# Patient Record
Sex: MALE | Race: WHITE
[De-identification: names, ages, dates, MRNs, and addresses within clinical notes are randomized per-mention and may not be internally consistent; named-entity substitution may affect disease eponyms.]

---

## 2017-01-11 ENCOUNTER — HOSPITAL ENCOUNTER (INPATIENT)
Dept: HOSPITAL 74 - FER | Age: 52
LOS: 9 days | Discharge: HOME | DRG: 329 | End: 2017-01-20
Attending: FAMILY MEDICINE | Admitting: FAMILY MEDICINE
Payer: COMMERCIAL

## 2017-01-11 VITALS — BODY MASS INDEX: 22.8 KG/M2

## 2017-01-11 DIAGNOSIS — K57.10: ICD-10-CM

## 2017-01-11 DIAGNOSIS — E87.0: ICD-10-CM

## 2017-01-11 DIAGNOSIS — E87.6: ICD-10-CM

## 2017-01-11 DIAGNOSIS — Z53.31: ICD-10-CM

## 2017-01-11 DIAGNOSIS — E83.42: ICD-10-CM

## 2017-01-11 DIAGNOSIS — K56.5: Primary | ICD-10-CM

## 2017-01-11 DIAGNOSIS — R33.9: ICD-10-CM

## 2017-01-11 DIAGNOSIS — M45.9: ICD-10-CM

## 2017-01-11 DIAGNOSIS — K66.0: ICD-10-CM

## 2017-01-11 DIAGNOSIS — K55.029: ICD-10-CM

## 2017-01-11 DIAGNOSIS — D50.0: ICD-10-CM

## 2017-01-11 DIAGNOSIS — R50.81: ICD-10-CM

## 2017-01-11 DIAGNOSIS — Z85.47: ICD-10-CM

## 2017-01-11 LAB
ALBUMIN SERPL-MCNC: 4.1 G/DL (ref 3.5–5)
ALP SERPL-CCNC: 58 U/L (ref 32–92)
ALT SERPL-CCNC: 14 U/L (ref 10–40)
AMYLASE SERPL-CCNC: 42 U/L (ref 25–125)
AMYLASE SERPL-CCNC: 47 U/L (ref 25–125)
ANION GAP SERPL CALC-SCNC: 9 MMOL/L (ref 8–16)
APTT BLD: 34.4 SECONDS (ref 24–38.9)
AST SERPL-CCNC: 33 U/L (ref 10–42)
BACTERIA #/AREA URNS HPF: (no result) /HPF
BASOPHILS # BLD: 0.8 % (ref 0–2)
BILIRUB SERPL-MCNC: 2 MG/DL (ref 0.2–1)
BILIRUB UR STRIP.AUTO-MCNC: (no result) MG/DL
CALCIUM SERPL-MCNC: 9.1 MG/DL (ref 8.4–10.2)
CK SERPL-CCNC: 55 IU/L (ref 38–174)
CO2 SERPL-SCNC: 26 MMOL/L (ref 22–28)
COLOR UR: YELLOW
CREAT SERPL-MCNC: 1 MG/DL (ref 0.6–1.3)
DEPRECATED RDW RBC AUTO: 12.6 % (ref 11.9–15.9)
EOSINOPHIL # BLD: 0 % (ref 0–4.5)
GLUCOSE SERPL-MCNC: 135 MG/DL (ref 74–106)
INR BLD: 1.79 (ref 0.82–1.09)
MCH RBC QN AUTO: 31.2 PG (ref 25.7–33.7)
MCHC RBC AUTO-ENTMCNC: 33 G/DL (ref 32–35.9)
MCV RBC: 94.4 FL (ref 80–96)
NEUTROPHILS # BLD: 87.6 % (ref 42.8–82.8)
PH UR: 6 [PH] (ref 4.5–8)
PLATELET # BLD AUTO: 285 K/MM3 (ref 134–434)
PMV BLD: 7.5 FL (ref 7.5–11.1)
PROT SERPL-MCNC: 6.8 G/DL (ref 6.4–8.3)
PROT UR QL STRIP: (no result)
PT PNL PPP: 19.5 SEC (ref 10.2–13)
RBC # BLD AUTO: (no result) /HPF (ref 0–3)
RBC # UR STRIP: (no result) /UL
SP GR UR: 1.01 (ref 1–1.02)
TROPONIN I SERPL-MCNC: < 0.03 NG/ML (ref 0.03–0.5)
TROPONIN I SERPL-MCNC: < 0.03 NG/ML (ref 0–0.05)
UROBILINOGEN UR STRIP-MCNC: (no result) MG/DL (ref 0.2–1)
WBC # BLD AUTO: 18.3 K/MM3 (ref 4–10)
WBC # UR AUTO: (no result) /UL (ref 3–5)

## 2017-01-11 RX ADMIN — PANTOPRAZOLE SODIUM SCH MLS/HR: 40 INJECTION, POWDER, FOR SOLUTION INTRAVENOUS at 21:34

## 2017-01-11 RX ADMIN — DEXTROSE AND SODIUM CHLORIDE SCH MLS/HR: 5; 450 INJECTION, SOLUTION INTRAVENOUS at 21:35

## 2017-01-11 RX ADMIN — HEPARIN SODIUM SCH UNIT: 5000 INJECTION, SOLUTION INTRAVENOUS; SUBCUTANEOUS at 21:35

## 2017-01-11 RX ADMIN — HYDROMORPHONE HYDROCHLORIDE PRN MG: 2 INJECTION, SOLUTION INTRAMUSCULAR; INTRAVENOUS; SUBCUTANEOUS at 23:00

## 2017-01-11 NOTE — HP
Admitting History and Physical





- Admission


Chief Complaint: acute abdomen pain x 2 days


History of Present Illness: 


WIFE AT Bedside ; 51 M w hx  Testis CA (2010) On remission post Orchiectomy and 

Lymph node resction Plus ChemoTx ( [place] Eval 2016) an episode of LLQ abdominal 

discomfort in past was told was assoc w adhesions. Has Usually Daily BM. He was 

in [facility] untill 2 days pre admission w periumb abdomen pain w Vomitin x 2 on 1 

day pre adm. Pt reported minimal BM on that day. Pt came to ED for Eval. 


History Source: Patient, Family Member


Limitations to Obtaining History: No Limitations





- Past Medical History


Gastrointestinal: Yes: Other (Adhesions post Testis Ca Sx 2010)


Musculoskeletal: Yes: Other (Ankylosing Spondylitis)





- Past Surgical History


Additional Past Surgical History: 


Rt Orchiectomy (2010) ;  Abdominal Lymph Node Resection (2010)





- Advance Directives


Advance Directives: Yes: Living Will, Health Care Proxy





- Smoking History


Smoking history: Never smoked


Have you smoked in the past 12 months: No





- Alcohol/Substance Use


Hx Alcohol Use: Yes (social)





- Social History


ADL: Independent


History of Recent Travel: No





Home Medications





- Allergies


Allergies/Adverse Reactions: 


 Allergies











Allergy/AdvReac Type Severity Reaction Status Date / Time


 


No Known Allergies Allergy   Verified 01/11/17 08:57














- Home Medications


Home Medications: 


Ambulatory Orders





Meloxicam [Mobic] 15 mg PO DAILY 01/11/17 


Sulfasalazine 1,000 mg PO DAILY 01/11/17 











Family Disease History





- Family Disease History


Family History: Unremarkable





Review of Systems





- Review of Systems


Constitutional: reports: No Symptoms, Loss of Appetite


HENT: reports: No Symptoms


Neck: reports: No Symptoms


Cardiovascular: reports: No Symptoms


Respiratory: reports: No Symptoms


Gastrointestinal: reports: Abdominal Pain, Constipation, Nausea, Vomiting


Genitourinary: reports: No Symptoms


Breasts: reports: No Symptoms Reported


Musculoskeletal: reports: Back Pain


Neurological: reports: No Symptoms


Endocrine: reports: No Symptoms


Hematology/Lymphatic: reports: No Symptoms


Psychiatric: reports: No Symptoms


Pain Intensity: 4





Physical Examination


Vital Signs: 


 Vital Signs











Temperature  99 F   01/11/17 16:36


 


Pulse Rate  86   01/11/17 16:36


 


Respiratory Rate  16   01/11/17 16:36


 


Blood Pressure  146/78   01/11/17 16:36


 


O2 Sat by Pulse Oximetry (%)  96   01/11/17 14:00











Constitutional: Yes: Well Nourished, Moderate Distress


Neck: Yes: Supple, Trachea Midline


Cardiovascular: Yes: Regular Rate and Rhythm


Respiratory: Yes: Regular, CTA Bilaterally


Gastrointestinal: Yes: Hypoactive Bowel Sounds, Tenderness, Rebound, Other (

pain w dig press MILD)


...Rectal Exam: Yes: Deferred


Musculoskeletal: Yes: Back Pain, Other (due to ch dz usually worse when in bed 

for long periods)


Extremities: Yes: WNL


Edema: No


Peripheral Pulses WNL: Yes


Peripheral Pulses: Left Doralis Pedis: 2+, Right Dorsalis Pedis: 2+


Neurological: Yes: WNL, Alert, Oriented


...Motor Strength: WNL


Psychiatric: Yes: Alert, Oriented





Imaging





- Results


Chest X-ray: Report Reviewed


Cat Scan: Report Reviewed, Other (ACUTE SBO with transition zone in mid abd and 

retroperotoneal  multiple clips and adesions)





Problem List





- Problems


(1) Testicle cancer


Assessment/Plan: 


In Remission- per Urol 2016 appt


Code(s): C62.90 - MALIG NEOPLASM OF UNSP TESTIS, UNSP DESCENDED OR UNDESCENDED 

  





(2) Synechia, posterior


Assessment/Plan: 


see CT Casn abd/Pelv report -> SBO


Code(s): H21.549 - POSTERIOR SYNECHIAE (IRIS), UNSPECIFIED EYE   





(3) Ankylosing spondylitis


Assessment/Plan: 


chronic on meds pre admission


Code(s): M45.9 - ANKYLOSING SPONDYLITIS OF UNSPECIFIED SITES IN SPINE





(4) Small bowel obstruction due to adhesions


Assessment/Plan: 


ADMISSION Dx- Acute - Surgeon Aware; Placed on NPO, NGT, PPI/IV, IVF Maintenance

, IV Pain Meds, HOLD Preadmission po meds. Disc w Pt and Wife All of above and 

plan of care. Defer to Surgeon further mgmt.


Code(s): K56.5 - INTESTINAL ADHESIONS W OBST (POSTPROCEDURAL) (POSTINFECTION)

## 2017-01-11 NOTE — PDOC
History of Present Illness





- General


Stated Complaint: ABDOMINAL PAIN


Time Seen by Provider: 01/11/17 08:35


History Source: Patient


Exam Limitations: No Limitations





- History of Present Illness


Initial Comments: 





01/11/17 09:42


This is a 52-year-old male with a history of testicular cancer status post 

surgical intervention and chemotherapy


Pt states that tow days ago, he began to have abdominal cramping/pain


Pain is located in the center of the abdomen


Pain is constant and intermittently, pt notes pain diffusely through out the 

abdomen


Pt noted fevers or and chills last night


(+) nausea and vomiting, multiple episodes (bilious)


No diarrhea


Pt concerned because he has not passed flatus in the past day


No recent travel


No ill contacts


No change in diet


Last night, temp 102





PMH: Testicular cancer, Ankylosing Spondolytis


PSH: Retroperitoneal LND


Meds: mobic, Sulfasalazine


ALL: NKDA


Social: Alcohol socially, drugs denies





GENERAL/CONSTITUTIONAL: Yes: fever, chills, weakness, loss of appetite.


HEAD, EYES, EARS, NOSE AND THROAT: No: change in vision, ear pain, discharge, 

sore throat, throat swelling.


CARDIOVASCULAR: No: chest pain, lightheadedness, palpitations, syncope


RESPIRATORY: No: cough, shortness of breath, wheezing, hemoptysis, stridor.


GASTROINTESTINAL: Yes: nausea, vomiting, abdominal pain No: diarrhea


GENITOURINARY: No: dysuria, hematuria, frequency, urgency, flank pain.


MUSCULOSKELETAL: No: back pain, neck pain, joint pain, muscle swelling or pain


SKIN AND BREASTS: No: lesions, pallor, rash or easy bruising.


NEUROLOGIC: No: headache, vertigo, paresthesias, weakness 


ENDOCRINE: No: unexplained weight gain or loss


HEMATOLOGIC/LYMPHATIC: No: anemia, easy bleeding, swelling nodes.








GENERAL: The patient is in no acute distress.


HEAD: Normal with no signs of trauma.


EYES: PERRLA, EOMI, sclera anicteric, conjunctiva clear.


ENT: Ears normal, nares patent, oropharynx clear without exudates.  Moist 

mucous membranes.


NECK: Normal range of motion, supple without lymphadenopathy, JVD, or masses.


LUNGS: Breath sounds equal, clear to auscultation bilaterally.  No wheezes, and 

no crackles.


HEART:Regular rate and rhythm, normal S1 and S2 without murmur, rub or gallop.


ABDOMEN: Soft, very tender to palpation in the supraumbilical area, (+) 

voluntary guarding, no rebound Also LLQ tenderness to palpation 


EXTREMITIES: Normal range of motion, no edema.  No clubbing or cyanosis. No 

erythema, or tenderness.


NEUROLOGICAL: Cranial nerves II through XII grossly intact.  Normal speech.  No 

focal neurological deficits. 


MUSCULOSKELETAL:  Back non-tender to palpation, no CVA tenderness


SKIN: Well healed midline scar of the abdomen 





Past History





- Past Medical History


Allergies/Adverse Reactions: 


 Allergies











Allergy/AdvReac Type Severity Reaction Status Date / Time


 


No Known Allergies Allergy   Verified 01/11/17 08:57











Home Medications: 


Ambulatory Orders





Meloxicam [Mobic] 15 mg PO DAILY 01/11/17 


Sulfasalazine 1,000 mg PO DAILY 01/11/17 











**Heart Score/ECG Review


  ** #1


ECG reviewed & interpreted by me at: 10:27





01/11/17 15:23


Sinus rhythm


Rate of 90 bpm


Left axis deviation


RBBB








ED Treatment Course





- LABORATORY


CBC & Chemistry Diagram: 


 01/12/17 07:53





 01/12/17 07:53





- ADDITIONAL ORDERS


Additional order review: 


 











  01/11/17





  09:11


 


RBC  4.64


 


MCV  94.4


 


MCHC  33.0


 


RDW  12.6


 


MPV  7.5


 


Neutrophils %  87.6 H


 


Lymphocytes %  4.2 L


 


Monocytes %  7.4


 


Eosinophils %  0.0


 


Basophils %  0.8














- RADIOLOGY


Radiology Studies Ordered: 














 Category Date Time Status


 


 ABDOMEN & PELVIS CT WITH CONTR [CT] Stat CT Scan  01/11/17 09:39 Ordered














Medical Decision Making





- Medical Decision Making


01/11/17 10:27


Will do labs


Will do CT of the abd and pelvis


Will do IVF


Will re assess





 Laboratory Tests











  01/11/17 01/11/17





  09:11 09:11


 


WBC  18.3 H 


 


Hgb  14.5 


 


Hct  43.8 


 


Plt Count  285 


 


Neutrophils %  87.6 H 


 


BUN   18


 


Creatinine   1.0














01/11/17 11:36


CT: SBO





Case reviewed with Dr Key


NGT to be placed


Will monitor output


Will admit to Dr. Jose Martin Hdez








01/11/17 11:45


Pending discussion with Dr Hdez





01/11/17 12:19


Case reviewed with Dr Jose Martin Hdez


Will admit to med surg








01/11/17 12:58


Nurses attempt to place NGT - unable to pass Left nare


Attempted twice to place NGT in right nare --> coughing


Removed


Pt vomited twice








01/11/17 13:18


NGT placed





01/11/17 15:23


EKG : Left axis deviation, RBBB








*DC/Admit/Observation/Transfer


Diagnosis at time of Disposition: 


 Small bowel obstruction due to adhesions





- Discharge Dispostion


Condition at time of disposition: Stable


Admit: Yes

## 2017-01-12 LAB
ALBUMIN SERPL-MCNC: 3.4 G/DL (ref 3.5–5)
ALP SERPL-CCNC: 58 U/L (ref 32–92)
ALT SERPL-CCNC: 12 U/L (ref 10–40)
ANION GAP SERPL CALC-SCNC: 10 MMOL/L (ref 8–16)
AST SERPL-CCNC: 28 U/L (ref 10–42)
BASOPHILS # BLD: 0.1 % (ref 0–2)
BILIRUB SERPL-MCNC: 1.4 MG/DL (ref 0.2–1)
CALCIUM SERPL-MCNC: 8.5 MG/DL (ref 8.4–10.2)
CO2 SERPL-SCNC: 24 MMOL/L (ref 22–28)
CREAT SERPL-MCNC: 0.8 MG/DL (ref 0.6–1.3)
DEPRECATED RDW RBC AUTO: 12.9 % (ref 11.9–15.9)
EOSINOPHIL # BLD: 0 % (ref 0–4.5)
GLUCOSE SERPL-MCNC: 139 MG/DL (ref 74–106)
MCH RBC QN AUTO: 31.3 PG (ref 25.7–33.7)
MCHC RBC AUTO-ENTMCNC: 32.5 G/DL (ref 32–35.9)
MCV RBC: 96.4 FL (ref 80–96)
NEUTROPHILS # BLD: 88.2 % (ref 42.8–82.8)
PLATELET # BLD AUTO: 231 K/MM3 (ref 134–434)
PMV BLD: 7.7 FL (ref 7.5–11.1)
PROT SERPL-MCNC: 5.5 G/DL (ref 6.4–8.3)
WBC # BLD AUTO: 17 K/MM3 (ref 4–10)

## 2017-01-12 RX ADMIN — HYDROMORPHONE HYDROCHLORIDE PRN MG: 2 INJECTION, SOLUTION INTRAMUSCULAR; INTRAVENOUS; SUBCUTANEOUS at 11:24

## 2017-01-12 RX ADMIN — PANTOPRAZOLE SODIUM SCH: 40 INJECTION, POWDER, FOR SOLUTION INTRAVENOUS at 22:00

## 2017-01-12 RX ADMIN — PIPERACILLIN SODIUM,TAZOBACTAM SODIUM SCH MLS/HR: 3; .375 INJECTION, POWDER, FOR SOLUTION INTRAVENOUS at 17:41

## 2017-01-12 RX ADMIN — HYDROMORPHONE HYDROCHLORIDE PRN MG: 2 INJECTION, SOLUTION INTRAMUSCULAR; INTRAVENOUS; SUBCUTANEOUS at 17:41

## 2017-01-12 RX ADMIN — HEPARIN SODIUM SCH UNIT: 5000 INJECTION, SOLUTION INTRAVENOUS; SUBCUTANEOUS at 06:33

## 2017-01-12 RX ADMIN — DEXTROSE AND SODIUM CHLORIDE SCH: 5; 450 INJECTION, SOLUTION INTRAVENOUS at 19:00

## 2017-01-12 RX ADMIN — HEPARIN SODIUM SCH: 5000 INJECTION, SOLUTION INTRAVENOUS; SUBCUTANEOUS at 22:00

## 2017-01-12 RX ADMIN — HEPARIN SODIUM SCH UNIT: 5000 INJECTION, SOLUTION INTRAVENOUS; SUBCUTANEOUS at 14:32

## 2017-01-12 RX ADMIN — ACETAMINOPHEN PRN MLS/HR: 10 INJECTION, SOLUTION INTRAVENOUS at 13:46

## 2017-01-12 RX ADMIN — PANTOPRAZOLE SODIUM SCH MLS/HR: 40 INJECTION, POWDER, FOR SOLUTION INTRAVENOUS at 10:19

## 2017-01-12 NOTE — PN
Progress Note, Physician


Chief Complaint: 


abdomen pain very much improved to minimal-mild; POS BM and Gas w Fleets and BM 

mgmt per Sx.


History of Present Illness: 


SEE Above; see Sx Notes.





- Current Medication List


Current Medications: 


Active Medications





Diphenhydramine HCl (Benadryl -)  25 mg PO HS PRN


   PRN Reason: INSOMNIA


Heparin Sodium (Porcine) (Heparin -)  5,000 unit SQ TID DEIDRA


   Last Admin: 01/12/17 14:32 Dose:  5,000 unit


Hydromorphone HCl (Dilaudid Injection -)  2 mg IVPUSH Q4H PRN


   PRN Reason: PAIN


   Last Admin: 01/12/17 17:41 Dose:  2 mg


Dextrose/Sodium Chloride (D5-1/2ns -)  1,000 mls @ 83 mls/hr IV ASDIR DEIDRA


   Last Admin: 01/11/17 21:35 Dose:  83 mls/hr


Pantoprazole Sodium (Protonix 40mg Ivpb (Pre-Docked))  100 mls @ 200 mls/hr 

IVPB BID DEIDRA


   Last Admin: 01/12/17 10:19 Dose:  200 mls/hr


Acetaminophen (Ofirmev Injection -)  65 mls @ 260 mls/hr IVPB Q4H PRN


   PRN Reason: PAIN/FEVER


   Last Admin: 01/12/17 13:46 Dose:  260 mls/hr


Piperacillin Sod/Tazobactam Sod (Zosyn 3.375gm Ivpb (Pre-Docked))  50 mls @ 100 

mls/hr IVPB Q8H-IV DEIDRA


   Last Admin: 01/12/17 17:41 Dose:  100 mls/hr











- Objective


Vital Signs: 


 Vital Signs











Temperature  101.1 F H  01/12/17 22:34


 


Pulse Rate  91 H  01/12/17 22:34


 


Respiratory Rate  17   01/12/17 22:34


 


Blood Pressure  134/70   01/12/17 22:34


 


O2 Sat by Pulse Oximetry (%)  95   01/12/17 22:34








TEMP 101.1


Constitutional: Yes: No Distress, Calm


Neck: Yes: WNL, Supple


Cardiovascular: Yes: Regular Rate and Rhythm


Respiratory: Yes: Regular, CTA Bilaterally


Gastrointestinal: Yes: Soft, Hyperactive Bowel Sounds, Other (+NGT in place; 

Mild Tend w deep press periumb area much improved c/w yest exam; no rbed no 

gding no bloating, no distention POS Soft and Depressible)


...Rectal Exam: Yes: Deferred


Musculoskeletal: Yes: Back Pain (+ ch pre admiss)


Extremities: Yes: WNL, Calf Tenderness (NONE)


Edema: No


Peripheral Pulses: Left Doralis Pedis: 2+, Right Dorsalis Pedis: 2+


Integumentary: Yes: WNL


Neurological: Yes: WNL, Alert, Oriented


...Motor Strength: WNL


Psychiatric: Yes: WNL, Alert, Oriented


Labs: 


 CBC, BMP





 01/12/17 07:53 





 01/12/17 07:53 





 INR, PTT











INR  1.79  (0.82-1.09)  H  01/11/17  20:15    








 Current Medications











Generic Name Dose Route Start Last Admin





  Trade Name Freq  PRN Reason Stop Dose Admin


 


Diphenhydramine HCl  25 mg 01/11/17 18:55  





  Benadryl -  PO   





  HS PRN   





  INSOMNIA   


 


Heparin Sodium (Porcine)  5,000 unit 01/11/17 22:00 01/12/17 14:32





  Heparin -  SQ   5,000 unit





  TID DEIDRA   Administration


 


Hydromorphone HCl  2 mg 01/11/17 18:55 01/12/17 17:41





  Dilaudid Injection -  IVPUSH   2 mg





  Q4H PRN   Administration





  PAIN   


 


Dextrose/Sodium Chloride  1,000 mls @ 83 mls/hr 01/11/17 19:00 01/11/17 21:35





  D5-1/2ns -  IV   83 mls/hr





  ASDIR DEIDRA   Administration


 


Pantoprazole Sodium  100 mls @ 200 mls/hr 01/11/17 22:00 01/12/17 10:19





  Protonix 40mg Ivpb (Pre-Docked)  IVPB   200 mls/hr





  BID DEIDRA   Administration


 


Acetaminophen  65 mls @ 260 mls/hr 01/12/17 13:22 01/12/17 13:46





  Ofirmev Injection -  IVPB   260 mls/hr





  Q4H PRN   Administration





  PAIN/FEVER   


 


Piperacillin Sod/Tazobactam Sod  50 mls @ 100 mls/hr 01/12/17 18:00 01/12/17 17:

41





  Zosyn 3.375gm Ivpb (Pre-Docked)  IVPB   100 mls/hr





  Q8H-IV DEIDRA   Administration








 Laboratory Last Values











WBC  17.0 K/mm3 (4.0-10.0)  H  01/12/17  07:53    


 


RBC  4.21 M/mm3 (4.00-5.60)   01/12/17  07:53    


 


Hgb  13.2 GM/dl (11.7-16.9)   01/12/17  07:53    


 


Hct  40.6 % (35.4-49)   01/12/17  07:53    


 


MCV  96.4 fl (80-96)  H  01/12/17  07:53    


 


MCHC  32.5 g/dl (32.0-35.9)   01/12/17  07:53    


 


RDW  12.9 % (11.9-15.9)   01/12/17  07:53    


 


Plt Count  231 K/MM3 (134-434)   01/12/17  07:53    


 


MPV  7.7 fl (7.5-11.1)   01/12/17  07:53    


 


Neutrophils %  88.2 % (42.8-82.8)  H  01/12/17  07:53    


 


Lymphocytes %  4.0 % (8-40)  L  01/12/17  07:53    


 


Monocytes %  7.7 % (3.8-10.2)   01/12/17  07:53    


 


Eosinophils %  0.0 % (0-4.5)   01/12/17  07:53    


 


Basophils %  0.1 % (0-2.0)   01/12/17  07:53    


 


INR  1.79  (0.82-1.09)  H  01/11/17  20:15    


 


PTT (Actin FS)  34.4 SECONDS (24.0-38.9)   01/11/17  20:15    


 


Sodium  135 mmol/L (136-145)  L  01/12/17  07:53    


 


Potassium  3.7 mmol/L (3.5-5.1)   01/12/17  07:53    


 


Chloride  101 mmol/L ()   01/12/17  07:53    


 


Carbon Dioxide  24 mmol/L (22-28)   01/12/17  07:53    


 


Anion Gap  10  (8-16)   01/12/17  07:53    


 


BUN  14 mg/dl (7-18)  D 01/12/17  07:53    


 


Creatinine  0.8 mg/dl (0.6-1.3)   01/12/17  07:53    


 


Creat Clearance w eGFR  > 60  (>60)   01/12/17  07:53    


 


Random Glucose  139 mg/dl ()  H  01/12/17  07:53    


 


Lactic Acid  1.017 mmol/L (0.4-2.0)   01/11/17  11:33    


 


Calcium  8.5 mg/dl (8.4-10.2)   01/12/17  07:53    


 


Total Bilirubin  1.4 mg/dl (0.2-1.0)  H D 01/12/17  07:53    


 


AST  28 U/L (10-42)   01/12/17  07:53    


 


ALT  12 U/L (10-40)   01/12/17  07:53    


 


Alkaline Phosphatase  58 U/L (32-92)   01/12/17  07:53    


 


Creatine Kinase  55 IU/L ()   01/11/17  15:00    


 


Troponin I  < 0.03 ng/ml (0.00-0.05)   01/11/17  20:15    


 


Total Protein  5.5 g/dl (6.4-8.3)  L  01/12/17  07:53    


 


Albumin  3.4 g/dl (3.5-5.0)  L  01/12/17  07:53    


 


Total Amylase  42 U/L ()   01/11/17  20:15    


 


Lipase  17 U/L (22-51)  L  01/11/17  20:15    


 


Urine Color  Yellow   01/11/17  12:53    


 


Urine Appearance  Clear   01/11/17  12:53    


 


Urine pH  6.0  (4.5-8)   01/11/17  12:53    


 


Ur Specific Gravity  1.010  (1.005-1.025)   01/11/17  12:53    


 


Urine Protein  2+  (NEGATIVE)  H  01/11/17  12:53    


 


Urine Glucose (UA)  Negative  (NEGATIVE)   01/11/17  12:53    


 


Urine Ketones  Trace  (NEGATIVE)   01/11/17  12:53    


 


Urine Blood  3+  (NEGATIVE)  H  01/11/17  12:53    


 


Urine Nitrite  Negative  (NEGATIVE)   01/11/17  12:53    


 


Urine Bilirubin  1+  (NEGATIVE)  H  01/11/17  12:53    


 


Urine Urobilinogen  2.0 e.u/dl  (0.2-1.0)   01/11/17  12:53    


 


Ur Leukocyte Esterase  Negative  (NEGATIVE)   01/11/17  12:53    


 


Urine RBC  5-10 /hpf (0-3)   01/11/17  12:53    


 


Urine WBC  0-2  (3-5)   01/11/17  12:53    


 


Urine Bacteria  Few /hpf (NEGATIVE)   01/11/17  12:53    








Amylase and Lipase NEG;


BCS x2 NEG x 24hrs


[facility] PEND





- ....Imaging


Cat Scan: Report Reviewed (Hi Grade SBO w/o Improvement ( Fup TEST [#2] done 

TODAY IN AM PRIOR To Fleets enema and Bowel Meds and To Pts Large BM and Flatus)





Problem List





- Problems


(1) Testicle cancer


Assessment/Plan: 


In Remission- per Urol 2016 appt; STLL there is some risk that SBO and 

Adhesions are assoc w Mets Testis CA  of Recrred dis - NO HX Of Present Testis 

or any other CA OR Evidence thus far. IF pt undergoes Sx mgmt of SBO, further 

eval of this may be performed introperatvely. Defer To Surgeon performing the 

Surgery if Surgery is done.


Code(s): C62.90 - MALIG NEOPLASM OF UNSP TESTIS, UNSP DESCENDED OR UNDESCENDED 

  





(2) Synechia, posterior


Assessment/Plan: 


With Hi degree SBO in FUP CT abd/pelv done today (CT #2); w/o changes.


Code(s): H21.549 - POSTERIOR SYNECHIAE (IRIS), UNSPECIFIED EYE   





(3) Ankylosing spondylitis


Assessment/Plan: 


chronic on meds pre admission- stable.


Code(s): M45.9 - ANKYLOSING SPONDYLITIS OF UNSPECIFIED SITES IN SPINE





(4) Small bowel obstruction due to adhesions


Assessment/Plan: 


Fup CT Today (CT #2) W Hi Grade FILIPE and No Change - THIS Study was followed by 

a Fllets enema and other BM mgmt. Patient GHad BM w LFlatu which was followed w 

Symptomatic improvement as well as his clinical exam. Patient DOES have temp 

between 100-101+ today. His WBC Is trending down, BCS x 2 are neg x 24hrs, UCS 

pend (W/O sxs) and on IV Zosyn. No Evidence of Perforation OR Sepsis. Will Do 

Daily CT Abd/Pel and cont monitor V Signs and Clinical exam. TPt AND wife 

Declined Sx as rec by inital Sx Team and requesting a second opinion. Defer To 

Surgical team althogh at THIS time to cont present mgmt w close monitoring 

seems reasonable considering clinical improvement. Disc w pt med condition at 

this time and with all ?s asked and responded to pts satisfaction.


Code(s): K56.5 - INTESTINAL ADHESIONS W OBST (POSTPROCEDURAL) (POSTINFECTION)





(5) Fever


Assessment/Plan: 


SEE ABOVE NOTES. No Evidence of Abd Perf or Sepsis Or Infection.


Code(s): R50.9 - FEVER, UNSPECIFIED

## 2017-01-12 NOTE — CONSULT
Consult


Consult Specialty:: surgery


Reason for Consultation:: small bowel obstruction





- History of Present Illness


Chief Complaint: abd pain


History of Present Illness: 


pt developed epigastric pain on monday.  pain was persistent.  BM tuesday and 

had N/V on Tuesday (bilious).  Came to ER on Wednesday (yesterday) because now 

he has fever.  Workup in ED included WBC of 18 and CT showing SBO.  He has hx 

of RPLND 6[place] ago [place] [facility] [place]   Currently he there is no 

evidence recurrence for cancer.  Since being in the hospital he is feeling 

better.  he has NGT and it put out 800cc last shift.  He says he takes the IV 

dilaudid for his ngt pain?  





- History Source


History Provided By: Patient


Limitations to Obtaining History: No Limitations





- Past Medical History


Gastrointestinal: Yes: Other (Adhesions post Testis Ca Sx 2010)


Musculoskeletal: Yes: Other (Ankylosing Spondylitis)





- Alcohol/Substance Use


Hx Alcohol Use: Yes (social)





- Smoking History


Smoking history: Never smoked


Have you smoked in the past 12 months: No





- Social History


ADL: Independent


History of Recent Travel: No





Home Medications





- Allergies


Allergies/Adverse Reactions: 


 Allergies











Allergy/AdvReac Type Severity Reaction Status Date / Time


 


No Known Allergies Allergy   Verified 01/11/17 08:57














- Home Medications


Home Medications: 


Ambulatory Orders





Meloxicam [Mobic] 15 mg PO DAILY 01/11/17 


Sulfasalazine 1,000 mg PO DAILY 01/11/17 











Review of Systems





- Review of Systems


Constitutional: denies: Chills, Diaphoresis


Eyes: denies: Blind Spots, Blurred Vision


HENT: denies: Difficult Swallowing, Ear Discharge


Neck: denies: Decreased ROM, Lumps


Cardiovascular: denies: Chest Pain, Edema


Respiratory: denies: Cough, Exercise Intolerance


Gastrointestinal: reports: Abdominal Pain, Bloating


Genitourinary: denies: Burning, Discharge


Breasts: denies: Breast Implants, Discharge from Nipple


Musculoskeletal: denies: Back Pain, Crepitus


Integumentary: denies: Blister, Bruising


Neurological: denies: Change in LOC, Change in Speech


Endocrine: denies: Excessive Sweating, Flushing, Unexplained Weight Loss


Hematology/Lymphatic: denies: Easily Bruised


Psychiatric: denies: Altered Sleep Pattern, Anxiety





Physical Exam


Vital Signs: 


 Vital Signs











Temperature  100.0 F H  01/12/17 06:00


 


Pulse Rate  90   01/12/17 06:00


 


Respiratory Rate  19   01/12/17 06:00


 


Blood Pressure  131/71   01/12/17 06:00


 


O2 Sat by Pulse Oximetry (%)  98   01/12/17 06:00











Constitutional: Yes: No Distress, Calm


Eyes: Yes: Conjunctiva Clear, EOM Intact


HENT: Yes: Atraumatic, Normocephalic


Neck: Yes: Supple, Trachea Midline


Cardiovascular: Yes: Regular Rate and Rhythm


Respiratory: Yes: Regular, CTA Bilaterally


Gastrointestinal: Yes: Soft, Distention, Palpable Mass, Tenderness, Tenderness, 

Epigastrium


...Rectal Exam: Yes: Deferred


Renal/: No: CVA Tenderness - Left, CVA Tenderness - Right


Breast(s): No: Mass, Nipple Inversion


Musculoskeletal: No: Back Pain, Joint Stiffness


Extremities: No: Calf Tenderness, Erythema


Integumentary: Yes: Bruising.  No: Erythema, Rash


Wound/Incision: Yes: Clean/Dry, Well Approximated, Other (full laparotomy scar)


Neurological: Yes: Alert, Oriented


Psychiatric: Yes: Alert, Oriented


Labs: 


 CBC, BMP





 01/12/17 07:53 





 01/12/17 07:53 











Imaging





- Results


Cat Scan: Report Reviewed, Image Reviewed





Problem List





- Problems


(1) Ankylosing spondylitis


Code(s): M45.9 - ANKYLOSING SPONDYLITIS OF UNSPECIFIED SITES IN SPINE





(2) Small bowel obstruction due to adhesions


Code(s): K56.5 - INTESTINAL ADHESIONS W OBST (POSTPROCEDURAL) (POSTINFECTION)





(3) Synechia, posterior


Code(s): H21.549 - POSTERIOR SYNECHIAE (IRIS), UNSPECIFIED EYE   





(4) Testicle cancer


Code(s): C62.90 - MALIG NEOPLASM OF UNSP TESTIS, UNSP DESCENDED OR UNDESCENDED 

  





(5) Intestinal obstruction


Assessment/Plan: 


patient feels a little better.  I expressed to him my concerns given 

leukocytosis, fever and tender abd exam.  he wishes to wait another day.  I 

explained that it maybe prudent to set an endpoint and consider operative 

therapy if he doesn't open up after a defined period.  He doesn't want to 

consider an operation at this second.  Will cont NGT, IVF and close 

observation.  If his pain worsens or he cont to deteriorate clinically will 

need operative intervention.  


Code(s): K56.60 - UNSPECIFIED INTESTINAL OBSTRUCTION

## 2017-01-12 NOTE — EKG
Test Reason : 

Blood Pressure : ***/*** mmHG

Vent. Rate : 090 BPM     Atrial Rate : 090 BPM

   P-R Int : 140 ms          QRS Dur : 144 ms

    QT Int : 404 ms       P-R-T Axes : 060 -34 001 degrees

   QTc Int : 494 ms

 

NORMAL SINUS RHYTHM

LEFT AXIS DEVIATION

RIGHT BUNDLE BRANCH BLOCK

T WAVE ABNORMALITY, CONSIDER INFERIOR ISCHEMIA

ABNORMAL ECG

NO PREVIOUS ECGS AVAILABLE

Confirmed by SUMAYA SANFORD MD ([id number]) on 1/12/2017 12:28:35 PM

 

Referred By: TASHA CABA           Confirmed By:SUMAYA SANFORD MD

## 2017-01-13 LAB
ANION GAP SERPL CALC-SCNC: 7 MMOL/L (ref 8–16)
BASOPHILS # BLD: 0.3 % (ref 0–2)
CALCIUM SERPL-MCNC: 8.5 MG/DL (ref 8.4–10.2)
CO2 SERPL-SCNC: 27 MMOL/L (ref 22–28)
CREAT SERPL-MCNC: 0.8 MG/DL (ref 0.6–1.3)
DEPRECATED RDW RBC AUTO: 12.4 % (ref 11.9–15.9)
EOSINOPHIL # BLD: 0.1 % (ref 0–4.5)
GLUCOSE SERPL-MCNC: 137 MG/DL (ref 74–106)
MCH RBC QN AUTO: 30.8 PG (ref 25.7–33.7)
MCHC RBC AUTO-ENTMCNC: 32.6 G/DL (ref 32–35.9)
MCV RBC: 94.3 FL (ref 80–96)
NEUTROPHILS # BLD: 87.8 % (ref 42.8–82.8)
PLATELET # BLD AUTO: 241 K/MM3 (ref 134–434)
PMV BLD: 7.4 FL (ref 7.5–11.1)
WBC # BLD AUTO: 14.9 K/MM3 (ref 4–10)

## 2017-01-13 PROCEDURE — 0DNJ0ZZ RELEASE APPENDIX, OPEN APPROACH: ICD-10-PCS | Performed by: SURGERY

## 2017-01-13 PROCEDURE — 0WJP4ZZ INSPECTION OF GASTROINTESTINAL TRACT, PERCUTANEOUS ENDOSCOPIC APPROACH: ICD-10-PCS | Performed by: SURGERY

## 2017-01-13 PROCEDURE — 0D1B0ZH BYPASS ILEUM TO CECUM, OPEN APPROACH: ICD-10-PCS | Performed by: SURGERY

## 2017-01-13 PROCEDURE — 0DN80ZZ RELEASE SMALL INTESTINE, OPEN APPROACH: ICD-10-PCS | Performed by: SURGERY

## 2017-01-13 PROCEDURE — 0W9G00Z DRAINAGE OF PERITONEAL CAVITY WITH DRAINAGE DEVICE, OPEN APPROACH: ICD-10-PCS | Performed by: SURGERY

## 2017-01-13 RX ADMIN — HEPARIN SODIUM SCH UNIT: 5000 INJECTION, SOLUTION INTRAVENOUS; SUBCUTANEOUS at 15:03

## 2017-01-13 RX ADMIN — HYDROMORPHONE HYDROCHLORIDE PRN MG: 2 INJECTION, SOLUTION INTRAMUSCULAR; INTRAVENOUS; SUBCUTANEOUS at 15:16

## 2017-01-13 RX ADMIN — PIPERACILLIN SODIUM,TAZOBACTAM SODIUM SCH MLS/HR: 3; .375 INJECTION, POWDER, FOR SOLUTION INTRAVENOUS at 10:37

## 2017-01-13 RX ADMIN — ACETAMINOPHEN PRN MLS/HR: 10 INJECTION, SOLUTION INTRAVENOUS at 15:03

## 2017-01-13 RX ADMIN — ACETAMINOPHEN PRN MLS/HR: 10 INJECTION, SOLUTION INTRAVENOUS at 00:28

## 2017-01-13 RX ADMIN — PANTOPRAZOLE SODIUM SCH MLS/HR: 40 INJECTION, POWDER, FOR SOLUTION INTRAVENOUS at 11:00

## 2017-01-13 RX ADMIN — PIPERACILLIN SODIUM,TAZOBACTAM SODIUM ONE GM: 3; .375 INJECTION, POWDER, FOR SOLUTION INTRAVENOUS at 21:40

## 2017-01-13 RX ADMIN — ACETAMINOPHEN PRN MLS/HR: 10 INJECTION, SOLUTION INTRAVENOUS at 07:45

## 2017-01-13 RX ADMIN — HYDROMORPHONE HYDROCHLORIDE PRN MG: 2 INJECTION, SOLUTION INTRAMUSCULAR; INTRAVENOUS; SUBCUTANEOUS at 02:50

## 2017-01-13 RX ADMIN — PIPERACILLIN SODIUM,TAZOBACTAM SODIUM SCH MLS/HR: 3; .375 INJECTION, POWDER, FOR SOLUTION INTRAVENOUS at 03:02

## 2017-01-13 RX ADMIN — HEPARIN SODIUM SCH UNIT: 5000 INJECTION, SOLUTION INTRAVENOUS; SUBCUTANEOUS at 05:46

## 2017-01-13 NOTE — CONSULT
Consult


Consult Specialty:: Surgery


Reason for Consultation:: Small bowel obstruction





- History of Present Illness


Chief Complaint: Abdominal pain and distention


History of Present Illness: 


51 male presents to the ER for abdominal distention and pain


Symptoms began 3-4 days prior


+ Vomiting 


Low grade fevers at home


No BM x 3-4 days


Had not passed flatus until yesterday





Asked to consult as a second opinion





Patient seen yesterday


States he was clinically improving


NG tube was in place


Was passing flatus


His abdomen was less distended 


His pain was under more control


He was afebrile


His vital signs were stable


His abdomen was mildly distended, nontender, without peritoneal signs


CT scan showed a high grade bowel obstruction, no pneumoperitoneum


After an in depth discussion, regarding the different treatment options of a 

high grade bowel obstruction including surgery and nonoperative management and 

due to the fact that he was clinically stable and appeared to be improving, the 

patient and his wife agreed and desired to continue close observation and 

nonsurgical management with IV fluids, NG tube and serial abdominal exams 

knowing that if there were any signs of clinically getting worse, surgery would 

be needed


Again they understood and agreed








Today his WBC improved from 18 to 14


However, he felt more distended and he had a fever to 101





On exam, his vital signs remained stable


His abdomen was somewhat more distended still without peritoneal signs


Repeat CT showed persistent bowel obstruction with a transition point in the RLQ





Due to his fever and lack of improvement, after a further discussion with the 

patient and his wife, we all agreed that he would now need surgical management


He is being transferred to [facility] per his request and has been 

consented for a diagnostic laparoscopy possible exploratory laparotomy, 

possible bowel resection, possible ostomy


All risks and benefits were explained


Both the patient and his wife understand and agree














- History Source


History Provided By: Patient, Family Member, Medical Record


Limitations to Obtaining History: No Limitations





- Past Medical History


Gastrointestinal: Yes: Other (Adhesions post Testis Ca Sx 2010)


Heme/Onc: Yes: Other (Testicular cancer )


Musculoskeletal: Yes: Other (Ankylosing Spondylitis)





- Past Surgical History


Additional Surgical History: Orchectomy for testicular cancer with 

retroperitoneal lymph node dissection 6 years ago at [facility]





- Alcohol/Substance Use


Hx Alcohol Use: Yes (social)





- Smoking History


Smoking history: Never smoked


Have you smoked in the past 12 months: No





- Social History


ADL: Independent


History of Recent Travel: No





Home Medications





- Allergies


Allergies/Adverse Reactions: 


 Allergies











Allergy/AdvReac Type Severity Reaction Status Date / Time


 


No Known Allergies Allergy   Verified 01/11/17 08:57














- Home Medications


Home Medications: 


Ambulatory Orders





Meloxicam [Mobic] 15 mg PO DAILY 01/11/17 


Sulfasalazine 1,000 mg PO DAILY 01/11/17 











Family Disease History





- Family Disease History


Family History: Unremarkable





Review of Systems





- Review of Systems


Constitutional: reports: Fever.  denies: Chills


HENT: reports: No Symptoms


Neck: reports: No Symptoms


Cardiovascular: denies: Chest Pain


Respiratory: denies: Cough


Gastrointestinal: reports: Abdominal Pain, Nausea.  denies: Diarrhea


Genitourinary: reports: No Symptoms


Neurological: denies: Change in LOC


Pain Intensity: 4





Physical Exam


Vital Signs: 


 Vital Signs











Temperature  100.6 F H  01/13/17 14:29


 


Pulse Rate  88   01/13/17 14:29


 


Respiratory Rate  20   01/13/17 14:29


 


Blood Pressure  128/59   01/13/17 14:29


 


O2 Sat by Pulse Oximetry (%)  95   01/12/17 22:34











Constitutional: Yes: Calm


Neck: Yes: Supple


Cardiovascular: Yes: Regular Rate and Rhythm


Respiratory: Yes: CTA Bilaterally


Gastrointestinal: Yes: Soft, Distention, Tenderness (Mild), Other (Well healed 

midline scar).  No: Tenderness, Rebound


Extremities: Yes: WNL


Neurological: Yes: Alert, Oriented


Labs: 


 CBC, BMP





 01/13/17 07:00 





 01/13/17 07:00 











Imaging





- Results


Cat Scan: Report Reviewed, Image Reviewed





Problem List





- Problems


(1) Fever


Code(s): R50.9 - FEVER, UNSPECIFIED   Qualifiers: 


     Fever type: other        Qualified Code(s): R50.81 - Fever presenting with 

conditions classified elsewhere  





(2) Intestinal obstruction


Code(s): K56.60 - UNSPECIFIED INTESTINAL OBSTRUCTION   Qualifiers: 


     Intestinal obstruction type: obstruction due to adhesions        Qualified 

Code(s): K56.5 - Intestinal adhesions [bands] with obstruction (postprocedural) 

(postinfection)  





(3) Small bowel obstruction due to adhesions


Code(s): K56.5 - INTESTINAL ADHESIONS W OBST (POSTPROCEDURAL) (POSTINFECTION)








Assessment/Plan


High grade bowel obstruction


Did not respond to conservative measures which was desired by the patient and 

his family


Will now proceed with a diagnostic laparoscopy, possible exploratory laparotomy

, possible bowel resection, possible ostomy due to lack of progression and fever


Risks and benefits explained 


Patient and wife understand and agree


Discussed with Primary Care Physician as well


Thank you

## 2017-01-13 NOTE — PN
Progress Note (short form)





- Note


Progress Note: 


1-13-17 @ 10:05pm


Pi w sbo no resolution w conservative mgmt (see pervios notes) today Febrile 

while w NGT , Iv Zosyn q 8hrs and antipyretic. Still Lg amt of fluid aspirated 

by ngt. Todays CT abd/pelvis showed mild mesentericinflaammationw sharp zone of 

transitionwith band likeadhesion as well as dilated and collapsed bowel (see 

report). Surgeons who have eval pt agreed pt needs prompt Surgical mgmt and pt 

and family agreed. Pt Is at time being surgically intervened. 





.1-> 100.6 ; P 91->86 ; 134/70 -> 128/79


 Current Medications











Generic Name Dose Route Start Last Admin





  Trade Name Freq  PRN Reason Stop Dose Admin


 


Diphenhydramine HCl  25 mg 01/11/17 18:55  





  Benadryl -  PO   





  HS PRN   





  INSOMNIA   


 


Heparin Sodium (Porcine)  5,000 unit 01/11/17 22:00 01/13/17 15:03





  Heparin -  SQ   5,000 unit





  TID DEIDRA   Administration


 


Hydromorphone HCl  2 mg 01/11/17 18:55 01/13/17 15:16





  Dilaudid Injection -  IVPUSH   2 mg





  Q4H PRN   Administration





  PAIN   


 


Dextrose/Sodium Chloride  1,000 mls @ 83 mls/hr 01/11/17 19:00 01/12/17 19:00





  D5-1/2ns -  IV   Not Given





  ASDIR DEIDRA   


 


Pantoprazole Sodium  100 mls @ 200 mls/hr 01/11/17 22:00 01/13/17 11:00





  Protonix 40mg Ivpb (Pre-Docked)  IVPB   200 mls/hr





  BID DEIDRA   Administration


 


Acetaminophen  65 mls @ 260 mls/hr 01/12/17 13:22 01/13/17 15:03





  Ofirmev Injection -  IVPB   260 mls/hr





  Q4H PRN   Administration





  PAIN/FEVER   


 


Piperacillin Sod/Tazobactam Sod  50 mls @ 100 mls/hr 01/12/17 18:00 01/13/17 10:

37





  Zosyn 3.375gm Ivpb (Pre-Docked)  IVPB   100 mls/hr





  Q8H-IV DEIDRA   Administration








 Laboratory Last Values











WBC  14.9 K/mm3 (4.0-10.0)  H  01/13/17  07:00    


 


RBC  4.18 M/mm3 (4.00-5.60)   01/13/17  07:00    


 


Hgb  12.9 GM/dl (11.7-16.9)   01/13/17  07:00    


 


Hct  39.4 % (35.4-49)   01/13/17  07:00    


 


MCV  94.3 fl (80-96)   01/13/17  07:00    


 


MCHC  32.6 g/dl (32.0-35.9)   01/13/17  07:00    


 


RDW  12.4 % (11.9-15.9)   01/13/17  07:00    


 


Plt Count  241 K/MM3 (134-434)   01/13/17  07:00    


 


MPV  7.4 fl (7.5-11.1)  L  01/13/17  07:00    


 


Neutrophils %  87.8 % (42.8-82.8)  H  01/13/17  07:00    


 


Lymphocytes %  3.8 % (8-40)  L  01/13/17  07:00    


 


Monocytes %  8.0 % (3.8-10.2)   01/13/17  07:00    


 


Eosinophils %  0.1 % (0-4.5)  D 01/13/17  07:00    


 


Basophils %  0.3 % (0-2.0)   01/13/17  07:00    


 


INR  1.79  (0.82-1.09)  H  01/11/17  20:15    


 


PTT (Actin FS)  34.4 SECONDS (24.0-38.9)   01/11/17  20:15    


 


Sodium  138 mmol/L (136-145)   01/13/17  07:00    


 


Potassium  3.5 mmol/L (3.5-5.1)   01/13/17  07:00    


 


Chloride  104 mmol/L ()   01/13/17  07:00    


 


Carbon Dioxide  27 mmol/L (22-28)   01/13/17  07:00    


 


Anion Gap  7  (8-16)  L  01/13/17  07:00    


 


BUN  13 mg/dl (7-18)   01/13/17  07:00    


 


Creatinine  0.8 mg/dl (0.6-1.3)   01/13/17  07:00    


 


Creat Clearance w eGFR  > 60  (>60)   01/12/17  07:53    


 


Random Glucose  137 mg/dl ()  H  01/13/17  07:00    


 


Lactic Acid  0.847 mmol/L (0.4-2.0)   01/13/17  07:00    


 


Calcium  8.5 mg/dl (8.4-10.2)   01/13/17  07:00    


 


Total Bilirubin  1.4 mg/dl (0.2-1.0)  H D 01/12/17  07:53    


 


AST  28 U/L (10-42)   01/12/17  07:53    


 


ALT  12 U/L (10-40)   01/12/17  07:53    


 


Alkaline Phosphatase  58 U/L (32-92)   01/12/17  07:53    


 


Creatine Kinase  55 IU/L ()   01/11/17  15:00    


 


Troponin I  < 0.03 ng/ml (0.00-0.05)   01/11/17  20:15    


 


Total Protein  5.5 g/dl (6.4-8.3)  L  01/12/17  07:53    


 


Albumin  3.4 g/dl (3.5-5.0)  L  01/12/17  07:53    


 


Total Amylase  42 U/L ()   01/11/17  20:15    


 


Lipase  17 U/L (22-51)  L  01/11/17  20:15    


 


Urine Color  Yellow   01/11/17  12:53    


 


Urine Appearance  Clear   01/11/17  12:53    


 


Urine pH  6.0  (4.5-8)   01/11/17  12:53    


 


Ur Specific Gravity  1.010  (1.005-1.025)   01/11/17  12:53    


 


Urine Protein  2+  (NEGATIVE)  H  01/11/17  12:53    


 


Urine Glucose (UA)  Negative  (NEGATIVE)   01/11/17  12:53    


 


Urine Ketones  Trace  (NEGATIVE)   01/11/17  12:53    


 


Urine Blood  3+  (NEGATIVE)  H  01/11/17  12:53    


 


Urine Nitrite  Negative  (NEGATIVE)   01/11/17  12:53    


 


Urine Bilirubin  1+  (NEGATIVE)  H  01/11/17  12:53    


 


Urine Urobilinogen  2.0 e.u/dl  (0.2-1.0)   01/11/17  12:53    


 


Ur Leukocyte Esterase  Negative  (NEGATIVE)   01/11/17  12:53    


 


Urine RBC  5-10 /hpf (0-3)   01/11/17  12:53    


 


Urine WBC  0-2  (3-5)   01/11/17  12:53    


 


Urine Bacteria  Few /hpf (NEGATIVE)   01/11/17  12:53    








UCS neg; BCS x2 neg x 3days


Current Active Problems





Ankylosing spondylitis (Acute) 


Fever (Acute) 


Intestinal obstruction (Acute) 


Small bowel obstruction due to adhesions (Acute) 


Synechia, posterior (Acute) 


Testicle cancer (Acute) 





Plan : floridalma follow.





Problem List





- Problems


(1) Testicle cancer


Code(s): C62.90 - MALIG NEOPLASM OF UNSP TESTIS, UNSP DESCENDED OR UNDESCENDED 

  





(2) Synechia, posterior


Code(s): H21.549 - POSTERIOR SYNECHIAE (IRIS), UNSPECIFIED EYE   





(3) Ankylosing spondylitis


Code(s): M45.9 - ANKYLOSING SPONDYLITIS OF UNSPECIFIED SITES IN SPINE





(4) Small bowel obstruction due to adhesions


Code(s): K56.5 - INTESTINAL ADHESIONS W OBST (POSTPROCEDURAL) (POSTINFECTION)





(5) Fever


Code(s): R50.9 - FEVER, UNSPECIFIED   Qualifiers: 


     Fever type: other        Qualified Code(s): R50.81 - Fever presenting with 

conditions classified elsewhere

## 2017-01-13 NOTE — PN
Progress Note, Physician


Chief Complaint: 


feels the same


History of Present Illness: 


patient feels just as bad as yesterday but not as bad as on admission.  ngt put 

out 1800 yesterday.  some BM with suppository.  he says bloating is unchanged.  

febrile to 101+ last night.  labs pending.  still having epigastric pain.  

patient tells me he asked for second opinion and saw dr. perez.  patient tells 

me dr perez is not on this weekend and that [facility] is and he wants 

care from them.  I ask if  that group has seen him yet and he says they have 

not.  I tried to contact patients wife yesterday at 7pm and did not get called 

back.





- Current Medication List


Current Medications: 


Active Medications





Diphenhydramine HCl (Benadryl -)  25 mg PO HS PRN


   PRN Reason: INSOMNIA


Heparin Sodium (Porcine) (Heparin -)  5,000 unit SQ TID [facility]


   Last Admin: 01/13/17 05:46 Dose:  5,000 unit


Hydromorphone HCl (Dilaudid Injection -)  2 mg IVPUSH Q4H PRN


   PRN Reason: PAIN


   Last Admin: 01/13/17 02:50 Dose:  2 mg


Dextrose/Sodium Chloride (D5-1/2ns -)  1,000 mls @ 83 mls/hr IV ASDIR [facility]


   Last Admin: 01/12/17 19:00 Dose:  Not Given


Pantoprazole Sodium (Protonix 40mg Ivpb (Pre-Docked))  100 mls @ 200 mls/hr 

IVPB BID [facility]


   Last Admin: 01/12/17 22:00 Dose:  Not Given


Acetaminophen (Ofirmev Injection -)  65 mls @ 260 mls/hr IVPB Q4H PRN


   PRN Reason: PAIN/FEVER


   Last Admin: 01/13/17 07:45 Dose:  260 mls/hr


Piperacillin Sod/Tazobactam Sod (Zosyn 3.375gm Ivpb (Pre-Docked))  50 mls @ 100 

mls/hr IVPB Q8H-IV DEIDRA


   Last Admin: 01/13/17 03:02 Dose:  100 mls/hr











- Objective


Vital Signs: 


 Vital Signs











Temperature  99.9 F H  01/13/17 06:00


 


Pulse Rate  72   01/13/17 06:00


 


Respiratory Rate  16   01/13/17 06:00


 


Blood Pressure  126/72   01/13/17 06:00


 


O2 Sat by Pulse Oximetry (%)  95   01/12/17 22:34











Constitutional: Yes: No Distress, Calm


Eyes: Yes: Conjunctiva Clear, EOM Intact


HENT: Yes: Atraumatic, Normocephalic


Neck: Yes: Supple, Trachea Midline


Cardiovascular: Yes: Regular Rate and Rhythm


Respiratory: Yes: Regular


Gastrointestinal: Yes: Soft, Distention, Tenderness, Tenderness, Epigastrium (

LUQ tender mass, min guarding.)


...Rectal Exam: Yes: Deferred


Genitourinary: No: CVA Tenderness - Left, CVA Tenderness - Right


Musculoskeletal: No: Joint Stiffness, Joint Swelling


Extremities: No: Calf Tenderness, Erythema


Integumentary: No: Erythema, Rash


Neurological: Yes: Alert, Oriented


Psychiatric: Yes: Alert, Oriented


Labs: 


 CBC, BMP





 01/13/17 07:00 





 01/13/17 07:00 





 INR, PTT











INR  1.79  (0.82-1.09)  H  01/11/17  20:15    














Problem List





- Problems


(1) Ankylosing spondylitis


Code(s): M45.9 - ANKYLOSING SPONDYLITIS OF UNSPECIFIED SITES IN SPINE





(2) Small bowel obstruction due to adhesions


Code(s): K56.5 - INTESTINAL ADHESIONS W OBST (POSTPROCEDURAL) (POSTINFECTION)





(3) Synechia, posterior


Code(s): H21.549 - POSTERIOR SYNECHIAE (IRIS), UNSPECIFIED EYE   





(4) Testicle cancer


Code(s): C62.90 - MALIG NEOPLASM OF UNSP TESTIS, UNSP DESCENDED OR UNDESCENDED 

  





(5) Intestinal obstruction


Assessment/Plan: 


I asked patient if he wanted different surgical team.  He says that him and his 

wife wanted [facility] to assume his surgical care.  





I explained to patient my opinion that the most conservative therapy at this 

point would be operative management given his fever, persistent pain and high 

NGT output.  





I have reached out and spoken to Dr. Odonnell and she contact Dr. Mays's group 

who do not accept consult.





I spoke to patient and asked if he still wants Dr. Perez and he says yes.  i 

have reached out to Dr. Perez and wait to hear back from him to see if he will 

assume surgical care for this patient.





I communicated to patient that until I hear otherwise, Dr. Key and I are his 

surgical care team and he is okay with that.


Again I have recommended operative therapy and do not feel the need for further 

imaging.





I have instructed nurse to direct imaging related questions as ordered by Dr. Perez to be directed to him.  


Code(s): K56.60 - UNSPECIFIED INTESTINAL OBSTRUCTION

## 2017-01-13 NOTE — CONS
INFECTIOUS DISEASE CONSULTATION

 

DATE OF CONSULTATION:  

 

DATE OF DICTATION:  01/13/2017 

 

HISTORY:  51-year-old male evaluated for fever and leukocytosis.

 

The patient was admitted on January 11, 2017, with a 2-day history of abdominal pain.

 The patient developed onset of periumbilical abdominal pain associated with nausea,

vomiting, fever and chills.  He reports his last bowel movement was on the day of

admission.  He presented to the hospital where a CAT scan of the abdomen and pelvis

showed small bowel obstruction.  An NG tube was placed.  He was seen in consultation

by surgery.  His course has been complicated by fever and elevated white blood cell

count.  Cultures were obtained, and he was empirically treated with Zosyn.  Patient

has had persistent fever in the hospital.  Cultures are pending.  

 

The patient has a history of testicular cancer diagnosed 6 years ago.  He is status

post orchiectomy.  He also had a retroperitoneal lymph node dissection in the past. 

He has been cancer-free for the past 6 years.  

 

PAST MEDICAL HISTORY:  

Positive for:

1.    Testicular cancer. 

2.    Ankylosing spondylitis.

 

ALLERGIES:  No known allergies

 

MEDICATIONS:  

1.    Mobic. 

2.    Sulfasalazine.

 

SOCIAL HISTORY:  Lives at home.  Nonsmoker, nondrinker.

 

SYSTEMS REVIEW:  

Neurologic:   No loss of consciousness, seizure activity, focal weakness.

Cardiac:  Negative for chest pain or palpitations. 

Respiratory:  Negative for cough or sputum production.

Gastrointestinal:  As per HPI.

Genitourinary:  Negative for urinary tract infection. 

 

LABORATORY DATA:  White count on admission 18,000, hematocrit 40.6, platelet count

231.  Creatinine 0.8.  Urinalysis:  0-2 white cells.  Urine culture pending.  Blood

cultures pending. 

 

PHYSICAL EXAMINATION:  

General:  On physical examination, he is awake and alert.  He is in no acute

distress.

Vital signs:  His temperature is 99.9, maximum temperature 101.8, blood pressure

126/76, pulse 72/regular, respirations 16 per minute.  

HEENT:  Sclerae anicteric. 

Heart:  Heart sounds S1, S2.

Lungs:  Clear.

Abdomen:  Positive bowel sounds.  Abdomen is soft.  No tenderness elicited. No mass,

rebound or rigidity.  No palpable liver or spleen.  

Extremities:  Negative for edema.

 

IMPRESSION:

1.    Small bowel obstruction, likely secondary to adhesions from previous

surgery.

2.    Fever, leukocytosis.  Possible sepsis secondary to gastrointestinal focus.  

3.    Testicular cancer status post orchiectomy.

4.    Status post retroperitoneal lymph node dissection.  

 

RECOMMENDATION:  

1.    I await culture results for empiric antibiotic coverage of gastrointestinal

pathogens with Zosyn.

2.    Surgical followup.

3.    Will follow.

 

Thank you for the kind referral 

 

ERIKA LUCERO M.D.

 

SOFIA/[id number]

DD: 01/13/2017 09:49

DT: 01/13/2017 10:24

Job #:  [id number]

## 2017-01-13 NOTE — CONSULT
Consult


Consult Specialty:: surgery





- Past Medical History


Gastrointestinal: Yes: Other (Adhesions post Testis Ca Sx 2010)


Musculoskeletal: Yes: Other (Ankylosing Spondylitis)





- Alcohol/Substance Use


Hx Alcohol Use: Yes (social)





- Smoking History


Smoking history: Never smoked


Have you smoked in the past 12 months: No





- Social History


ADL: Independent


History of Recent Travel: No





Home Medications





- Allergies


Allergies/Adverse Reactions: 


 Allergies











Allergy/AdvReac Type Severity Reaction Status Date / Time


 


No Known Allergies Allergy   Verified 01/11/17 08:57














- Home Medications


Home Medications: 


Ambulatory Orders





Meloxicam [Mobic] 15 mg PO DAILY 01/11/17 


Sulfasalazine 1,000 mg PO DAILY 01/11/17 











Physical Exam


Vital Signs: 


 Vital Signs











Temperature  99.9 F H  01/13/17 06:00


 


Pulse Rate  72   01/13/17 06:00


 


Respiratory Rate  16   01/13/17 06:00


 


Blood Pressure  126/72   01/13/17 06:00


 


O2 Sat by Pulse Oximetry (%)  95   01/12/17 22:34











Labs: 


 CBC, BMP





 01/13/17 07:00 











Problem List





- Problems


(1) Ankylosing spondylitis


Code(s): M45.9 - ANKYLOSING SPONDYLITIS OF UNSPECIFIED SITES IN SPINE





(2) Small bowel obstruction due to adhesions


Code(s): K56.5 - INTESTINAL ADHESIONS W OBST (POSTPROCEDURAL) (POSTINFECTION)





(3) Synechia, posterior


Code(s): H21.549 - POSTERIOR SYNECHIAE (IRIS), UNSPECIFIED EYE   





(4) Testicle cancer


Code(s): C62.90 - MALIG NEOPLASM OF UNSP TESTIS, UNSP DESCENDED OR UNDESCENDED 

  





(5) Intestinal obstruction


Code(s): K56.60 - UNSPECIFIED INTESTINAL OBSTRUCTION

## 2017-01-13 NOTE — PN
Progress Note (short form)





- Note


Progress Note: 


ID Consult dictated


SBO


Fever/ leukocytosis, possible sepsis


Continue empiric zosyn


Surgical follow up

## 2017-01-14 LAB
ANION GAP SERPL CALC-SCNC: 12 MMOL/L (ref 8–16)
ANION GAP SERPL CALC-SCNC: 9 MMOL/L (ref 8–16)
BASOPHILS # BLD: 0.1 % (ref 0–2)
BASOPHILS # BLD: 0.2 % (ref 0–2)
CALCIUM SERPL-MCNC: 7.8 MG/DL (ref 8.5–10.1)
CALCIUM SERPL-MCNC: 8 MG/DL (ref 8.5–10.1)
CO2 SERPL-SCNC: 28 MMOL/L (ref 21–32)
CO2 SERPL-SCNC: 28 MMOL/L (ref 21–32)
CREAT SERPL-MCNC: 0.7 MG/DL (ref 0.7–1.3)
CREAT SERPL-MCNC: 0.9 MG/DL (ref 0.7–1.3)
DEPRECATED RDW RBC AUTO: 13.1 % (ref 11.9–15.9)
DEPRECATED RDW RBC AUTO: 13.2 % (ref 11.9–15.9)
EOSINOPHIL # BLD: 0 % (ref 0–4.5)
EOSINOPHIL # BLD: 0.1 % (ref 0–4.5)
GLUCOSE SERPL-MCNC: 153 MG/DL (ref 74–106)
GLUCOSE SERPL-MCNC: 161 MG/DL (ref 74–106)
MCH RBC QN AUTO: 31.3 PG (ref 25.7–33.7)
MCH RBC QN AUTO: 31.4 PG (ref 25.7–33.7)
MCHC RBC AUTO-ENTMCNC: 32.2 G/DL (ref 32–35.9)
MCHC RBC AUTO-ENTMCNC: 32.5 G/DL (ref 32–35.9)
MCV RBC: 96.4 FL (ref 80–96)
MCV RBC: 97.4 FL (ref 80–96)
NEUTROPHILS # BLD: 82.8 % (ref 42.8–82.8)
NEUTROPHILS # BLD: 89.8 % (ref 42.8–82.8)
PLATELET # BLD AUTO: 258 K/MM3 (ref 134–434)
PLATELET # BLD AUTO: 345 K/MM3 (ref 134–434)
PMV BLD: 7.6 FL (ref 7.5–11.1)
PMV BLD: 8.2 FL (ref 7.5–11.1)
WBC # BLD AUTO: 11.7 K/MM3 (ref 4–10)
WBC # BLD AUTO: 12.4 K/MM3 (ref 4–10)

## 2017-01-14 RX ADMIN — SODIUM CHLORIDE SCH MLS/HR: 9 INJECTION, SOLUTION INTRAVENOUS at 21:30

## 2017-01-14 RX ADMIN — PIPERACILLIN SODIUM,TAZOBACTAM SODIUM ONE GM: 3; .375 INJECTION, POWDER, FOR SOLUTION INTRAVENOUS at 02:00

## 2017-01-14 RX ADMIN — HEPARIN SODIUM SCH UNIT: 5000 INJECTION, SOLUTION INTRAVENOUS; SUBCUTANEOUS at 06:18

## 2017-01-14 RX ADMIN — PIPERACILLIN SODIUM,TAZOBACTAM SODIUM SCH MLS/HR: 3; .375 INJECTION, POWDER, FOR SOLUTION INTRAVENOUS at 02:15

## 2017-01-14 RX ADMIN — METRONIDAZOLE SCH MLS/HR: 500 INJECTION, SOLUTION INTRAVENOUS at 09:25

## 2017-01-14 RX ADMIN — ACETAMINOPHEN PRN MLS/HR: 10 INJECTION, SOLUTION INTRAVENOUS at 21:14

## 2017-01-14 RX ADMIN — METRONIDAZOLE SCH MLS/HR: 500 INJECTION, SOLUTION INTRAVENOUS at 17:06

## 2017-01-14 RX ADMIN — HEPARIN SODIUM SCH UNIT: 5000 INJECTION, SOLUTION INTRAVENOUS; SUBCUTANEOUS at 22:52

## 2017-01-14 RX ADMIN — PIPERACILLIN SODIUM,TAZOBACTAM SODIUM SCH MLS/HR: 3; .375 INJECTION, POWDER, FOR SOLUTION INTRAVENOUS at 17:06

## 2017-01-14 RX ADMIN — PANTOPRAZOLE SODIUM SCH MLS/HR: 40 INJECTION, POWDER, FOR SOLUTION INTRAVENOUS at 22:52

## 2017-01-14 RX ADMIN — HYDROMORPHONE HYDROCHLORIDE SCH MG: 10 INJECTION, SOLUTION INTRAMUSCULAR; INTRAVENOUS; SUBCUTANEOUS at 02:30

## 2017-01-14 RX ADMIN — PIPERACILLIN SODIUM,TAZOBACTAM SODIUM SCH MLS/HR: 3; .375 INJECTION, POWDER, FOR SOLUTION INTRAVENOUS at 09:25

## 2017-01-14 RX ADMIN — HEPARIN SODIUM SCH UNIT: 5000 INJECTION, SOLUTION INTRAVENOUS; SUBCUTANEOUS at 15:21

## 2017-01-14 NOTE — PN
Progress Note, Physician


History of Present Illness: 


S/P exploratory laparotomy, lysis of adhesions


POD # 1


No c/o abdominal pain at present


No flatus


Temp elevation noted. WBC improved





- Current Medication List


Current Medications: 


Active Medications





Heparin Sodium (Porcine) (Heparin -)  5,000 unit SQ TID [facility]


   Last Admin: 01/14/17 06:18 Dose:  5,000 unit


Hydromorphone HCl (Dilaudid Pca -)  0 mg PCA PCA DEIDRA


   PRN Reason: Protocol


   Stop: 01/17/17 02:06


   Last Admin: 01/14/17 02:30 Dose:  10 mg


Hydromorphone HCl (Dilaudid Injection -)  0.5 mg IVPUSH [id number] PRN


   PRN Reason: PAIN


   Stop: 01/17/17 02:07


   Last Admin: 01/14/17 02:30 Dose:  0.5 mg


Sodium Chloride (Normal Saline -)  1,000 mls @ 150 mls/hr IV ASDIR [facility]ID[facility]


Metronidazole (Flagyl 500mg Premixed Ivpb -)  100 mls @ 100 mls/hr IVPB Q8H-IV 

[facility]


   Last Admin: 01/14/17 09:25 Dose:  100 mls/hr


Acetaminophen (Ofirmev Injection -)  65 mls @ 260 mls/hr IVPB Q4H PRN


   PRN Reason: PAIN/FEVER


   Last Admin: 01/14/17 06:18 Dose:  260 mls/hr


Pantoprazole Sodium (Protonix 40mg Ivpb (Pre-Docked))  100 mls @ 200 mls/hr 

IVPB BID [facility]


   Last Admin: 01/14/17 09:24 Dose:  200 mls/hr


Piperacillin Sod/Tazobactam Sod (Zosyn 3.375gm Ivpb (Pre-Docked))  50 mls @ 100 

mls/hr IVPB Q8H-IV [facility]


   Last Admin: 01/14/17 09:25 Dose:  100 mls/hr


Ondansetron HCl (Zofran Injection)  4 mg IVPB Q4H PRN


   PRN Reason: NAUSEA AND/OR VOMITING











- Objective


Vital Signs: 


 Vital Signs











Temperature  98.8 F   01/14/17 08:00


 


Pulse Rate  93 H  01/14/17 08:00


 


Respiratory Rate  16   01/14/17 08:00


 


Blood Pressure  112/72   01/14/17 08:00


 


O2 Sat by Pulse Oximetry (%)  98   01/14/17 08:40











Constitutional: Yes: No Distress


Eyes: Yes: Conjunctiva Clear


Cardiovascular: Yes: Regular Rate and Rhythm, S1, S2


Respiratory: Yes: Diminished


Gastrointestinal: Yes: Soft, Other (post op dressing in place).  No: Normal 

Bowel Sounds, Tenderness


Edema: No


Labs: 


 CBC, BMP





 01/14/17 05:20 





 01/14/17 05:20 





 INR, PTT











INR  1.79  (0.82-1.09)  H  01/11/17  20:15    














Assessment/Plan


POD #1 exploratory laparotomy/ ARI


S/P SBO


Fever


Leukocytosis- improved





Will repeat BC in light of fever


Continue zosyn/ flagyl empirically

## 2017-01-14 NOTE — CONSULT
Consult - text type





- Consultation


Consultation Note: 


PULM/CCM





CC; abd pain





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 

Lymph node resction Plus ChemoTx ( NL Eval 2016)  now s/p POD #1 s/p Dx --> 

Exlap, angy, sb resxn w/ anastomosis, ileocolic resxn w/ anastomosis & component 

seperation 1/13 with Dr Perez 





Came to ICU overnight for post op care. Did well. Is now OOB, ambulating, Ruiz 

d/c, minimal drainage from AMARI x 2, afebrile and normotensive








 Past Medical History





Gastrointestinal                 Other (Adhesions post Testis Ca Sx 2010)


Heme/Onc                         Other (Testicular cancer )








 











 Medication  Instructions  Recorded


 


Meloxicam [Mobic] 15 mg PO DAILY 01/11/17


 


Sulfasalazine 1,000 mg PO DAILY 01/11/17


 


Oxycodone HCl/Acetaminophen 1 - 2 tab PO Q6H #28 tab MDD 4 01/13/17





[Percocet 5-325 mg Tablet]  











 Current Medications





Heparin Sodium (Porcine) (Heparin -)  5,000 unit SQ TID DEIDRA


   Last Admin: 01/14/17 06:18 Dose:  5,000 unit


Hydromorphone HCl (Dilaudid Pca -)  0 mg PCA PCA DEIDRA


   PRN Reason: Protocol


   Stop: 01/17/17 02:06


   Last Admin: 01/14/17 02:30 Dose:  10 mg


Hydromorphone HCl (Dilaudid Injection -)  0.5 mg IVPUSH [id number] PRN


   PRN Reason: PAIN


   Stop: 01/17/17 02:07


   Last Admin: 01/14/17 02:30 Dose:  0.5 mg


Sodium Chloride (Normal Saline -)  1,000 mls @ 150 mls/hr IV ASDIR DEIDRA


Metronidazole (Flagyl 500mg Premixed Ivpb -)  100 mls @ 100 mls/hr IVPB Q8H-IV 

DEIDRA


   Last Admin: 01/14/17 09:25 Dose:  100 mls/hr


Acetaminophen (Ofirmev Injection -)  65 mls @ 260 mls/hr IVPB Q4H PRN


   PRN Reason: PAIN/FEVER


   Last Admin: 01/14/17 06:18 Dose:  260 mls/hr


Pantoprazole Sodium (Protonix 40mg Ivpb (Pre-Docked))  100 mls @ 200 mls/hr 

IVPB BID DEIDRA


   Last Admin: 01/14/17 09:24 Dose:  200 mls/hr


Piperacillin Sod/Tazobactam Sod (Zosyn 3.375gm Ivpb (Pre-Docked))  50 mls @ 100 

mls/hr IVPB Q8H-IV DEIDRA


   Last Admin: 01/14/17 09:25 Dose:  100 mls/hr


Ondansetron HCl (Zofran Injection)  4 mg IVPB Q4H PRN


   PRN Reason: NAUSEA AND/OR VOMITING





 Vital Signs











Temp  98.4 F   01/14/17 10:00


 


Pulse  72   01/14/17 11:43


 


Resp  18   01/14/17 10:00


 


BP  109/72   01/14/17 10:00


 


Pulse Ox  99   01/14/17 11:43








 Intake & Output











 01/13/17 01/14/17 01/14/17





 23:59 11:59 23:59


 


Intake Total 3000 550 


 


Output Total 1950 400 


 


Balance 1050 150 


 


Weight  80.371 kg 


 


Intake:   


 


  IV 3000 450 


 


    Normal Saline - 1,000 ml  450 





    @ 150 mls/hr IV ASDIR [facility]   





    Rx#:[id number]   


 


  IVPB  100 


 


  Oral 0  


 


Output:   


 


  Gastric Drainage 400  


 


  Urine 1250 400 


 


    Void 400  


 


    Ruiz  200 


 


  Estimated Blood Loss 300  


 


Other:   


 


  Voiding Method Toilet Indwelling Catheter 


 


  Weight Measurement Method  Built in [facility]


General: Alert. NAD.


Neuro: intact


PULM: clear, using incentive spirometery 


ABD: Midline incision dressing intact, AMARI x 2 with sero sang, R more bloody, 

absent BS, no flatus yet


: ruiz to gravity (clear)


LE: SCDs b/l. No pain/swelling b/l





Problem List





- Problems


(1) Small bowel obstruction due to adhesions





Assessment/Plan: 


POD #1 s/p Dx --> Exlap lap, angy, sb resxn w/ anastomosis, ileocolic resxn w/ 

anastomosis & component seperation 1/13





NPO/IVF


GI/DVT PPX


NGT to LWCS


Monitor I/O's


watch H/h


Incentive spirometer


Tight glycemic control


Pain management via PCA, well controlled


 OOB to chair/ambulate





[facility]


[id number]

## 2017-01-14 NOTE — PN
Progress Note, Physician


Chief Complaint: 


Wife at bedside. Pain level 4-5. No sob or cough.


History of Present Illness: 


POD#1- Pt had fever yseterday despite Iv Abx while persistent sbo. Sx on board 

determined pt required immediate surgical intervention. Pt was transferred to 

[facility] and he undeerwent Exp Lap- seebelow. 





- Current Medication List


Current Medications: 


Active Medications





Heparin Sodium (Porcine) (Heparin -)  5,000 unit SQ TID DEIDRA


   Last Admin: 01/14/17 15:21 Dose:  5,000 unit


Hydromorphone HCl (Dilaudid Pca -)  0 mg PCA PCA DEIDRA


   PRN Reason: Protocol


   Stop: 01/17/17 02:06


   Last Admin: 01/14/17 02:30 Dose:  10 mg


Hydromorphone HCl (Dilaudid Injection -)  0.5 mg IVPUSH [id number] PRN


   PRN Reason: PAIN


   Stop: 01/17/17 02:07


   Last Admin: 01/14/17 02:30 Dose:  0.5 mg


Sodium Chloride (Normal Saline -)  1,000 mls @ 150 mls/hr IV ASDIR DEIDRA


Metronidazole (Flagyl 500mg Premixed Ivpb -)  100 mls @ 100 mls/hr IVPB Q8H-IV 

DEIDRA


   Last Admin: 01/14/17 09:25 Dose:  100 mls/hr


Acetaminophen (Ofirmev Injection -)  65 mls @ 260 mls/hr IVPB Q4H PRN


   PRN Reason: PAIN/FEVER


   Last Admin: 01/14/17 06:18 Dose:  260 mls/hr


Pantoprazole Sodium (Protonix 40mg Ivpb (Pre-Docked))  100 mls @ 200 mls/hr 

IVPB BID DEIDRA


   Last Admin: 01/14/17 09:24 Dose:  200 mls/hr


Piperacillin Sod/Tazobactam Sod (Zosyn 3.375gm Ivpb (Pre-Docked))  50 mls @ 100 

mls/hr IVPB Q8H-IV DEIDRA


   Last Admin: 01/14/17 09:25 Dose:  100 mls/hr


Ondansetron HCl (Zofran Injection)  4 mg IVPB Q4H PRN


   PRN Reason: NAUSEA AND/OR VOMITING











- Objective


Vital Signs: 


 Vital Signs











Temperature  98 F   01/14/17 14:00


 


Pulse Rate  83   01/14/17 16:00


 


Respiratory Rate  20   01/14/17 14:00


 


Blood Pressure  132/77   01/14/17 16:00


 


O2 Sat by Pulse Oximetry (%)  99   01/14/17 11:43











Constitutional: Yes: No Distress, Calm


Neck: Yes: Supple


Cardiovascular: Yes: Regular Rate and Rhythm, Bruit (NONE), JVD (NONE), Murmur (

NONE)


Respiratory: Yes: Regular, Other (poor insp effort; dep lung sds)


Gastrointestinal: Yes: Soft, Hypoactive Bowel Sounds, Tenderness (mild 

surroundin wound no gding)


...Rectal Exam: Yes: Deferred


Genitourinary: Yes: Other (no void yet post cath dc'd at 12 noon)


Musculoskeletal: Yes: WNL


Extremities: Yes: WNL, Calf Tenderness (none), Other (no edema no Khoi's)


Edema: No


Peripheral Pulses: Left Doralis Pedis: 2+, Right Dorsalis Pedis: 2+


Integumentary: Yes: WNL


Wound/Incision: Yes: Dressing Dry and Intact


Neurological: Yes: WNL


...Motor Strength: WNL


Psychiatric: Yes: Alert, Oriented


Labs: 


 CBC, BMP





 01/14/17 05:20 





 01/14/17 05:20 





 INR, PTT











INR  1.79  (0.82-1.09)  H  01/11/17  20:15    








1- BCS x2 NEG x 72 h


1-14-17 BCsx2 PEND


 Laboratory Last Values











WBC  11.7 K/mm3 (4.0-10.0)  H  01/14/17  05:20    


 


RBC  4.15 M/mm3 (4.00-5.60)   01/14/17  05:20    


 


Hgb  13.0 GM/dL (11.7-16.9)   01/14/17  05:20    


 


Hct  40.0 % (35.4-49)   01/14/17  05:20    


 


MCV  96.4 fl (80-96)  H  01/14/17  05:20    


 


MCHC  32.5 g/dl (32.0-35.9)   01/14/17  05:20    


 


RDW  13.1 % (11.9-15.9)   01/14/17  05:20    


 


Plt Count  258 K/MM3 (134-434)  D 01/14/17  05:20    


 


MPV  7.6 fl (7.5-11.1)   01/14/17  05:20    


 


Neutrophils %  89.8 % (42.8-82.8)  H  01/14/17  05:20    


 


Lymphocytes %  2.7 % (8-40)  L D 01/14/17  05:20    


 


Monocytes %  7.4 % (3.8-10.2)   01/14/17  05:20    


 


Eosinophils %  0.0 % (0-4.5)  D 01/14/17  05:20    


 


Basophils %  0.1 % (0-2.0)   01/14/17  05:20    


 


INR  1.79  (0.82-1.09)  H  01/11/17  20:15    


 


PTT (Actin FS)  34.4 SECONDS (24.0-38.9)   01/11/17  20:15    


 


Sodium  145 mmol/L (136-145)   01/14/17  05:20    


 


Potassium  3.9 mmol/L (3.5-5.1)   01/14/17  05:20    


 


Chloride  108 mmol/L ()  H  01/14/17  05:20    


 


Carbon Dioxide  28 mmol/L (21-32)   01/14/17  05:20    


 


Anion Gap  9  (8-16)   01/14/17  05:20    


 


BUN  14 mg/dL (7-18)   01/14/17  05:20    


 


Creatinine  0.7 mg/dL (0.7-1.3)  D 01/14/17  05:20    


 


Creat Clearance w eGFR  > 60  (>60)   01/12/17  07:53    


 


Random Glucose  161 mg/dL ()  H  01/14/17  05:20    


 


Lactic Acid  0.847 mmol/L (0.4-2.0)   01/13/17  07:00    


 


Calcium  7.8 mg/dL (8.5-10.1)  L  01/14/17  05:20    


 


Total Bilirubin  1.4 mg/dl (0.2-1.0)  H D 01/12/17  07:53    


 


AST  28 U/L (10-42)   01/12/17  07:53    


 


ALT  12 U/L (10-40)   01/12/17  07:53    


 


Alkaline Phosphatase  58 U/L (32-92)   01/12/17  07:53    


 


Creatine Kinase  55 IU/L ()   01/11/17  15:00    


 


Troponin I  < 0.03 ng/ml (0.00-0.05)   01/11/17  20:15    


 


Total Protein  5.5 g/dl (6.4-8.3)  L  01/12/17  07:53    


 


Albumin  3.4 g/dl (3.5-5.0)  L  01/12/17  07:53    


 


Total Amylase  42 U/L ()   01/11/17  20:15    


 


Lipase  17 U/L (22-51)  L  01/11/17  20:15    


 


Urine Color  Yellow   01/11/17  12:53    


 


Urine Appearance  Clear   01/11/17  12:53    


 


Urine pH  6.0  (4.5-8)   01/11/17  12:53    


 


Ur Specific Gravity  1.010  (1.005-1.025)   01/11/17  12:53    


 


Urine Protein  2+  (NEGATIVE)  H  01/11/17  12:53    


 


Urine Glucose (UA)  Negative  (NEGATIVE)   01/11/17  12:53    


 


Urine Ketones  Trace  (NEGATIVE)   01/11/17  12:53    


 


Urine Blood  3+  (NEGATIVE)  H  01/11/17  12:53    


 


Urine Nitrite  Negative  (NEGATIVE)   01/11/17  12:53    


 


Urine Bilirubin  1+  (NEGATIVE)  H  01/11/17  12:53    


 


Urine Urobilinogen  2.0 e.u/dl  (0.2-1.0)   01/11/17  12:53    


 


Ur Leukocyte Esterase  Negative  (NEGATIVE)   01/11/17  12:53    


 


Urine RBC  5-10 /hpf (0-3)   01/11/17  12:53    


 


Urine WBC  0-2  (3-5)   01/11/17  12:53    


 


Urine Bacteria  Few /hpf (NEGATIVE)   01/11/17  12:53    


 


Blood Type  O POSITIVE   01/14/17  00:01    


 


Antibody Screen  Negative   01/13/17  23:00    








 Laboratory Last Values











WBC  11.7 K/mm3 (4.0-10.0)  H  01/14/17  05:20    


 


RBC  4.15 M/mm3 (4.00-5.60)   01/14/17  05:20    


 


Hgb  13.0 GM/dL (11.7-16.9)   01/14/17  05:20    


 


Hct  40.0 % (35.4-49)   01/14/17  05:20    


 


MCV  96.4 fl (80-96)  H  01/14/17  05:20    


 


MCHC  32.5 g/dl (32.0-35.9)   01/14/17  05:20    


 


RDW  13.1 % (11.9-15.9)   01/14/17  05:20    


 


Plt Count  258 K/MM3 (134-434)  D 01/14/17  05:20    


 


MPV  7.6 fl (7.5-11.1)   01/14/17  05:20    


 


Neutrophils %  89.8 % (42.8-82.8)  H  01/14/17  05:20    


 


Lymphocytes %  2.7 % (8-40)  L D 01/14/17  05:20    


 


Monocytes %  7.4 % (3.8-10.2)   01/14/17  05:20    


 


Eosinophils %  0.0 % (0-4.5)  D 01/14/17  05:20    


 


Basophils %  0.1 % (0-2.0)   01/14/17  05:20    


 


INR  1.79  (0.82-1.09)  H  01/11/17  20:15    


 


PTT (Actin FS)  34.4 SECONDS (24.0-38.9)   01/11/17  20:15    


 


Sodium  145 mmol/L (136-145)   01/14/17  05:20    


 


Potassium  3.9 mmol/L (3.5-5.1)   01/14/17  05:20    


 


Chloride  108 mmol/L ()  H  01/14/17  05:20    


 


Carbon Dioxide  28 mmol/L (21-32)   01/14/17  05:20    


 


Anion Gap  9  (8-16)   01/14/17  05:20    


 


BUN  14 mg/dL (7-18)   01/14/17  05:20    


 


Creatinine  0.7 mg/dL (0.7-1.3)  D 01/14/17  05:20    


 


Creat Clearance w eGFR  > 60  (>60)   01/12/17  07:53    


 


Random Glucose  161 mg/dL ()  H  01/14/17  05:20    


 


Lactic Acid  0.847 mmol/L (0.4-2.0)   01/13/17  07:00    


 


Calcium  7.8 mg/dL (8.5-10.1)  L  01/14/17  05:20    


 


Total Bilirubin  1.4 mg/dl (0.2-1.0)  H D 01/12/17  07:53    


 


AST  28 U/L (10-42)   01/12/17  07:53    


 


ALT  12 U/L (10-40)   01/12/17  07:53    


 


Alkaline Phosphatase  58 U/L (32-92)   01/12/17  07:53    


 


Creatine Kinase  55 IU/L ()   01/11/17  15:00    


 


Troponin I  < 0.03 ng/ml (0.00-0.05)   01/11/17  20:15    


 


Total Protein  5.5 g/dl (6.4-8.3)  L  01/12/17  07:53    


 


Albumin  3.4 g/dl (3.5-5.0)  L  01/12/17  07:53    


 


Total Amylase  42 U/L ()   01/11/17  20:15    


 


Lipase  17 U/L (22-51)  L  01/11/17  20:15    


 


Urine Color  Yellow   01/11/17  12:53    


 


Urine Appearance  Clear   01/11/17  12:53    


 


Urine pH  6.0  (4.5-8)   01/11/17  12:53    


 


Ur Specific Gravity  1.010  (1.005-1.025)   01/11/17  12:53    


 


Urine Protein  2+  (NEGATIVE)  H  01/11/17  12:53    


 


Urine Glucose (UA)  Negative  (NEGATIVE)   01/11/17  12:53    


 


Urine Ketones  Trace  (NEGATIVE)   01/11/17  12:53    


 


Urine Blood  3+  (NEGATIVE)  H  01/11/17  12:53    


 


Urine Nitrite  Negative  (NEGATIVE)   01/11/17  12:53    


 


Urine Bilirubin  1+  (NEGATIVE)  H  01/11/17  12:53    


 


Urine Urobilinogen  2.0 e.u/dl  (0.2-1.0)   01/11/17  12:53    


 


Ur Leukocyte Esterase  Negative  (NEGATIVE)   01/11/17  12:53    


 


Urine RBC  5-10 /hpf (0-3)   01/11/17  12:53    


 


Urine WBC  0-2  (3-5)   01/11/17  12:53    


 


Urine Bacteria  Few /hpf (NEGATIVE)   01/11/17  12:53    


 


Blood Type  O POSITIVE   01/14/17  00:01    


 


Antibody Screen  Negative   01/13/17  23:00    








 Current Medications











Generic Name Dose Route Start Last Admin





  Trade Name Freq  PRN Reason Stop Dose Admin


 


Heparin Sodium (Porcine)  5,000 unit 01/14/17 06:00 01/14/17 15:21





  Heparin -  SQ   5,000 unit





  TID DEIDRA   Administration


 


Hydromorphone HCl  0 mg 01/14/17 02:45 01/14/17 02:30





  Dilaudid Pca -  PCA 01/17/17 02:06  10 mg





  PCA DEIDRA   Administration





  Protocol   


 


Hydromorphone HCl  0.5 mg 01/14/17 02:45 01/14/17 02:30





  Dilaudid Injection -  IVPUSH 01/17/17 02:07  0.5 mg





  [id number] PRN   Administration





  PAIN   


 


Sodium Chloride  1,000 mls @ 150 mls/hr 01/14/17 02:45  





  Normal Saline -  IV   





  ASDIR DEIDRA   


 


Metronidazole  100 mls @ 100 mls/hr 01/14/17 10:00 01/14/17 09:25





  Flagyl 500mg Premixed Ivpb -  IVPB   100 mls/hr





  Q8H-IV DEIDRA   Administration


 


Acetaminophen  65 mls @ 260 mls/hr 01/14/17 02:45 01/14/17 06:18





  Ofirmev Injection -  IVPB   260 mls/hr





  Q4H PRN   Administration





  PAIN/FEVER   


 


Pantoprazole Sodium  100 mls @ 200 mls/hr 01/14/17 10:00 01/14/17 09:24





  Protonix 40mg Ivpb (Pre-Docked)  IVPB   200 mls/hr





  BID DEIDRA   Administration


 


Piperacillin Sod/Tazobactam Sod  50 mls @ 100 mls/hr 01/14/17 10:00 01/14/17 09:

25





  Zosyn 3.375gm Ivpb (Pre-Docked)  IVPB   100 mls/hr





  Q8H-IV DEIDRA   Administration


 


Ondansetron HCl  4 mg 01/14/17 02:45  





  Zofran Injection  IVPB   





  Q4H PRN   





  NAUSEA AND/OR VOMITING   














Problem List





- Problems


(1) Testicle cancer


Assessment/Plan: 


Testis CA 2010 w Node Resction in Abd and Retroperitoneal areas with NL eval 

2016 


Code(s): C62.90 - MALIG NEOPLASM OF UNSP TESTIS, UNSP DESCENDED OR UNDESCENDED 

  





(2) Synechia, posterior


Assessment/Plan: 


post Above mentioned sx 2010; + extensive seen intraop yest.


Code(s): H21.549 - POSTERIOR SYNECHIAE (IRIS), UNSPECIFIED EYE   





(3) Ankylosing spondylitis


Assessment/Plan: 


chronic on meds pre admission- stable.


Code(s): M45.9 - ANKYLOSING SPONDYLITIS OF UNSPECIFIED SITES IN SPINE





(4) Small bowel obstruction due to adhesions


Assessment/Plan: 


POD #1 Explo Lap w SB Resct w anastomosis Plus Ileocolic Resct w amastomosis.l


Code(s): K56.5 - INTESTINAL ADHESIONS W OBST (POSTPROCEDURAL) (POSTINFECTION)





(5) Fever


Assessment/Plan: 


SEE ABOVE NOTES. POD#1 - afeb now on UV zosyn and flagyl. BCS neg 1=11 and #2 

set pend.


Code(s): R50.9 - FEVER, UNSPECIFIED   Qualifiers: 


     Fever type: other        Qualified Code(s): R50.81 - Fever presenting with 

conditions classified elsewhere  





(6) Urine retention


Assessment/Plan: 


POD #1 Post Cath dc'd. If no sponatn voiding in next 2 hrs - order staight cath 

and bladder scan; will consider Urol consult if no spont resolution.


Code(s): R33.9 - RETENTION OF URINE, UNSPECIFIED





(7) Status post exploratory laparotomy


Assessment/Plan: 


POD #1 due to SBO due to Adhesions from Testis Ca W Abd cav and retropeit 

Adenopathy w Left orchiectomy and node resections 2010. Clinically stable to dc 

to Med Sx floor.


Code(s): Z98.89 - OTHER SPECIFIED POSTPROCEDURAL STATES * DO NOT USE *

## 2017-01-14 NOTE — OP
Operative Note





- Note:


Operative Date: 01/13/17


Pre-Operative Diagnosis: High grade small bowel obstruction


Operation: Diagnostic laparoscopy, exploratory laparotomy, extensive lysis of 

adhesions, small bowel resection with anastomosis, ileocolic resection with 

anastomosis, component seperation


Findings: 


Extensive adhesions throughout the abdominal cavity


Small bowel was densely adhered to the abdominal wall, pelvis and other parts 

of the small bowel


Necrotic diverticulum-appearing structure off of the mid- jejunum with adhesion 

to the distal ileum


Thickening of the terminal ileum


Appendix densely adherent to the ileocolic vessel


Post-Operative Diagnosis: Other (High grade small bowel obstruction, extensive 

adhesions, necrotic diverticulum- appearing structure)


Surgeon: Randy Perez


Assistant: Susan Nam


Anesthesia: General


Specimens Removed: Small bowel.  Ileum/cecum/appendix.  Adhesive band


Estimated Blood Loss (mls): 300


Drains & Tubes with Location: NG tube.  [place].  AMARI x 2- left AMARI is 

intraabdominal; right AMARI is above the fascia


Operative Report Dictated: Yes

## 2017-01-14 NOTE — PN
[id number] long as patient has been oob and ambulating.





Original Note:








Progress Note (short form)





- Note


Progress Note: 


POD #1 s/p Dx --> Exlap lap, angy, sb resxn w/ anastomosis, ileocolic resxn w/ 

anastomosis & component seperation 1/13





Patient is alert. Resting comfortably at this time. No acute events since 

surgery per RN notes.


C/o incisional tenderness. Pain controled well via non-narcotic (Ofirmev) and 

PCA (Dilaudid)


Very eager to get up and participate in his own recovery.


Denies n/v/f/c, CP or SOB.





 Last Vital Signs











Temp Pulse Resp BP Pulse Ox


 


 98.8 F   93 H  16   112/72   98 


 


 01/14/17 08:00  01/14/17 08:00  01/14/17 08:00  01/14/17 08:00  01/14/17 08:40








 CBC, BMP





 01/14/17 05:20 





 01/14/17 05:20 





 


 OUTPUT











  01/13/17 01/13/17 01/13/17 01/13/17 01/14/17





  03:23 03:24 07:06 16:19 06:00


 


AMARI x2 30 15   


 


NGT   600 400 


 


Ruiz     200











PE


General: Alert. NAD.


ABD: Midline incision dressing intact with old staining (dressing to be changed 

in AM). AMARI x2 on bulb suction (left serosanguinous, right bloody)


: ruiz to gravity (clear)


LE: SCDs b/l. No pain/swelling b/l





<Rodri Galan - Last Filed: 01/14/17 08:52>





- Note


Progress Note: 


Agree


POD 1


Pain controlled with medication


NG tube in place


T max 101


Vital signs stable


Abd soft, dressing intact


AMARI x 2 serosanguinous


WBC 12


H/H stable





Ruiz removed


Continue antibiotics


Ambulation and incentive spirometer


Ice chips


Await pathology


Serial abdominal exams- await rerun of bowel function











<Randy Perez - Last Filed: 01/14/17 14:37>





Problem List





- Problems


(1) Small bowel obstruction due to adhesions


Assessment/Plan: 


POD #1 s/p Dx --> Exlap lap, angy, sb resxn w/ anastomosis, ileocolic resxn w/ 

anastomosis & component seperation 1/13





NPO/IVF


GI/DVT PPX


NGT to LWCS


Monitor I/O's


CBC/BMP in AM


Incentive spirometer


Tight glycemic control


Pain management via PCA


Downgrade patient to floor at ICU discretion.


Once patient transferred to Med-Surg floor, he is allowed to be OOB to chair/

ambulate


Code(s): K56.5 - INTESTINAL ADHESIONS W OBST (POSTPROCEDURAL) (POSTINFECTION)








<Rodri Galan - Last Filed: 01/14/17 08:52>





- Problems


(1) Fever


Code(s): R50.9 - FEVER, UNSPECIFIED   Qualifiers: 


     Fever type: other        Qualified Code(s): R50.81 - Fever presenting with 

conditions classified elsewhere  





(2) Intestinal obstruction


Code(s): K56.60 - UNSPECIFIED INTESTINAL OBSTRUCTION   Qualifiers: 


     Intestinal obstruction type: obstruction due to adhesions        Qualified 

Code(s): K56.5 - Intestinal adhesions [bands] with obstruction (postprocedural) 

(postinfection)  





(3) Small bowel obstruction due to adhesions


Code(s): K56.5 - INTESTINAL ADHESIONS W OBST (POSTPROCEDURAL) (POSTINFECTION)








<Randy Perez - Last Filed: 01/14/17 14:37>

## 2017-01-15 LAB
ANION GAP SERPL CALC-SCNC: 6 MMOL/L (ref 8–16)
BASOPHILS # BLD: 0.5 % (ref 0–2)
CALCIUM SERPL-MCNC: 7.5 MG/DL (ref 8.5–10.1)
CO2 SERPL-SCNC: 26 MMOL/L (ref 21–32)
CREAT SERPL-MCNC: 0.7 MG/DL (ref 0.7–1.3)
DEPRECATED RDW RBC AUTO: 13.4 % (ref 11.9–15.9)
EOSINOPHIL # BLD: 0.2 % (ref 0–4.5)
GLUCOSE SERPL-MCNC: 134 MG/DL (ref 74–106)
MAGNESIUM SERPL-MCNC: 2.4 MG/DL (ref 1.8–2.4)
MCH RBC QN AUTO: 32.5 PG (ref 25.7–33.7)
MCHC RBC AUTO-ENTMCNC: 33.4 G/DL (ref 32–35.9)
MCV RBC: 97.3 FL (ref 80–96)
NEUTROPHILS # BLD: 81.2 % (ref 42.8–82.8)
PHOSPHATE SERPL-MCNC: 1.6 MG/DL (ref 2.5–4.9)
PLATELET # BLD AUTO: 219 K/MM3 (ref 134–434)
PMV BLD: 8 FL (ref 7.5–11.1)
WBC # BLD AUTO: 10.2 K/MM3 (ref 4–10)

## 2017-01-15 RX ADMIN — HEPARIN SODIUM SCH UNIT: 5000 INJECTION, SOLUTION INTRAVENOUS; SUBCUTANEOUS at 05:49

## 2017-01-15 RX ADMIN — SODIUM CHLORIDE SCH MLS/HR: 9 INJECTION, SOLUTION INTRAVENOUS at 03:04

## 2017-01-15 RX ADMIN — SODIUM CHLORIDE SCH MLS/HR: 9 INJECTION, SOLUTION INTRAVENOUS at 19:02

## 2017-01-15 RX ADMIN — PANTOPRAZOLE SODIUM SCH MLS/HR: 40 INJECTION, POWDER, FOR SOLUTION INTRAVENOUS at 21:33

## 2017-01-15 RX ADMIN — HYDROMORPHONE HYDROCHLORIDE SCH: 10 INJECTION, SOLUTION INTRAMUSCULAR; INTRAVENOUS; SUBCUTANEOUS at 02:35

## 2017-01-15 RX ADMIN — PANTOPRAZOLE SODIUM SCH MLS/HR: 40 INJECTION, POWDER, FOR SOLUTION INTRAVENOUS at 09:47

## 2017-01-15 RX ADMIN — METRONIDAZOLE SCH MLS/HR: 500 INJECTION, SOLUTION INTRAVENOUS at 09:47

## 2017-01-15 RX ADMIN — SODIUM CHLORIDE SCH MLS/HR: 9 INJECTION, SOLUTION INTRAVENOUS at 11:56

## 2017-01-15 RX ADMIN — HEPARIN SODIUM SCH UNIT: 5000 INJECTION, SOLUTION INTRAVENOUS; SUBCUTANEOUS at 21:31

## 2017-01-15 RX ADMIN — METRONIDAZOLE SCH MLS/HR: 500 INJECTION, SOLUTION INTRAVENOUS at 17:51

## 2017-01-15 RX ADMIN — PIPERACILLIN SODIUM,TAZOBACTAM SODIUM SCH MLS/HR: 3; .375 INJECTION, POWDER, FOR SOLUTION INTRAVENOUS at 17:51

## 2017-01-15 RX ADMIN — METRONIDAZOLE SCH MLS/HR: 500 INJECTION, SOLUTION INTRAVENOUS at 01:18

## 2017-01-15 RX ADMIN — PIPERACILLIN SODIUM,TAZOBACTAM SODIUM SCH MLS/HR: 3; .375 INJECTION, POWDER, FOR SOLUTION INTRAVENOUS at 09:47

## 2017-01-15 RX ADMIN — PIPERACILLIN SODIUM,TAZOBACTAM SODIUM SCH MLS/HR: 3; .375 INJECTION, POWDER, FOR SOLUTION INTRAVENOUS at 03:04

## 2017-01-15 RX ADMIN — HEPARIN SODIUM SCH UNIT: 5000 INJECTION, SOLUTION INTRAVENOUS; SUBCUTANEOUS at 15:05

## 2017-01-15 RX ADMIN — HYDROMORPHONE HYDROCHLORIDE SCH: 10 INJECTION, SOLUTION INTRAMUSCULAR; INTRAVENOUS; SUBCUTANEOUS at 19:03

## 2017-01-15 NOTE — PN
Progress Note, Physician


Chief Complaint: 


Mother at bedside. Doing well c PCA No Flatus.  No sob or cough.


History of Present Illness: 


POD#2- SEE Prev notes; Post Exp Lap on PCA, + Voiding >700ml. Afeb.





- Current Medication List


Current Medications: 


Active Medications





Heparin Sodium (Porcine) (Heparin -)  5,000 unit SQ TID DEIDRA


   Last Admin: 01/15/17 15:05 Dose:  5,000 unit


Hydromorphone HCl (Dilaudid Pca -)  0 mg PCA PCA DEIDRA


   PRN Reason: Protocol


   Stop: 01/17/17 02:06


   Last Admin: 01/15/17 02:35 Dose:  Not Given


Acetaminophen (Ofirmev Injection -)  65 mls @ 260 mls/hr IVPB Q4H PRN


   PRN Reason: PAIN/FEVER


   Last Admin: 01/14/17 21:14 Dose:  260 mls/hr


Metronidazole (Flagyl 500mg Premixed Ivpb -)  100 mls @ 100 mls/hr IVPB Q8H-IV 

DEIDRA


   Last Admin: 01/15/17 09:47 Dose:  100 mls/hr


Pantoprazole Sodium (Protonix 40mg Ivpb (Pre-Docked))  100 mls @ 200 mls/hr 

IVPB BID DEIDRA


   Last Admin: 01/15/17 09:47 Dose:  200 mls/hr


Piperacillin Sod/Tazobactam Sod (Zosyn 3.375gm Ivpb (Pre-Docked))  50 mls @ 100 

mls/hr IVPB Q8H-IV DEIDRA


   Last Admin: 01/15/17 09:47 Dose:  100 mls/hr


Sodium Chloride (Normal Saline -)  1,000 mls @ 150 mls/hr IV ASDIR [facility]


   Last Admin: 01/15/17 11:56 Dose:  150 mls/hr


Ondansetron HCl (Zofran Injection)  4 mg IVPB Q4H PRN


   PRN Reason: NAUSEA AND/OR VOMITING











- Objective


Vital Signs: 


 Vital Signs











Temperature  98.8 F   01/15/17 14:00


 


Pulse Rate  68   01/15/17 14:00


 


Respiratory Rate  20   01/15/17 14:00


 


Blood Pressure  136/76   01/15/17 14:00


 


O2 Sat by Pulse Oximetry (%)  95   01/14/17 21:00











Constitutional: Yes: No Distress, Calm


Neck: Yes: Supple


Cardiovascular: Yes: Regular Rate and Rhythm, Murmur (NO)


Respiratory: Yes: CTA Bilaterally, Other (POOR Insp effort)


Gastrointestinal: Yes: Soft, Other (minimal tend in area of wound; no rbed)


...Rectal Exam: Yes: Deferred


Genitourinary: Yes: Other (VOIDING Spontan)


Musculoskeletal: Yes: WNL


Extremities: Yes: Calf Tenderness (NONE; No Khoi's W SCD)


Edema: No


Peripheral Pulses: Left Doralis Pedis: 2+, Right Dorsalis Pedis: 2+


Integumentary: Yes: WNL


Wound/Incision: Yes: Dressing Dry and Intact


Neurological: Yes: Alert, Oriented, Other (NN focal)


...Motor Strength: WNL


Psychiatric: Yes: Alert, Oriented (x3)


Labs: 


 CBC, BMP





 01/15/17 06:35 





 01/15/17 06:35 





 INR, PTT











INR  1.79  (0.82-1.09)  H  01/11/17  20:15    








 Current Medications











Generic Name Dose Route Start Last Admin





  Trade Name Freq  PRN Reason Stop Dose Admin


 


Heparin Sodium (Porcine)  5,000 unit 01/14/17 22:00 01/15/17 15:05





  Heparin -  SQ   5,000 unit





  TID DEIDRA   Administration


 


Hydromorphone HCl  0 mg 01/14/17 19:12 01/15/17 02:35





  Dilaudid Pca -  PCA 01/17/17 02:06  Not Given





  PCA [facility]   





  Protocol   


 


Acetaminophen  65 mls @ 260 mls/hr 01/14/17 19:12 01/14/17 21:14





  Ofirmev Injection -  IVPB   260 mls/hr





  Q4H PRN   Administration





  PAIN/FEVER   


 


Metronidazole  100 mls @ 100 mls/hr 01/15/17 02:00 01/15/17 09:47





  Flagyl 500mg Premixed Ivpb -  IVPB   100 mls/hr





  Q8H-IV DEIDRA   Administration


 


Pantoprazole Sodium  100 mls @ 200 mls/hr 01/14/17 22:00 01/15/17 09:47





  Protonix 40mg Ivpb (Pre-Docked)  IVPB   200 mls/hr





  BID DEIDRA   Administration


 


Piperacillin Sod/Tazobactam Sod  50 mls @ 100 mls/hr 01/15/17 02:00 01/15/17 09:

47





  Zosyn 3.375gm Ivpb (Pre-Docked)  IVPB   100 mls/hr





  Q8H-IV DEIDRA   Administration


 


Sodium Chloride  1,000 mls @ 150 mls/hr 01/14/17 19:12 01/15/17 11:56





  Normal Saline -  IV   150 mls/hr





  ASDIR DEIDRA   Administration


 


Ondansetron HCl  4 mg 01/14/17 19:12  





  Zofran Injection  IVPB   





  Q4H PRN   





  NAUSEA AND/OR VOMITING   














Problem List





- Problems


(1) Testicle cancer


Assessment/Plan: 


Testis CA 2010 w Node Resction in Abd and Retroperitoneal areas with NL eval 

2016 


Code(s): C62.90 - MALIG NEOPLASM OF UNSP TESTIS, UNSP DESCENDED OR UNDESCENDED 

  





(2) Synechia, posterior


Assessment/Plan: 


post Above mentioned sx 2010; + extensive seen intraop.


Code(s): H21.549 - POSTERIOR SYNECHIAE (IRIS), UNSPECIFIED EYE   





(3) Ankylosing spondylitis


Assessment/Plan: 


chronic on meds pre admission- stable.


Code(s): M45.9 - ANKYLOSING SPONDYLITIS OF UNSPECIFIED SITES IN SPINE





(4) Small bowel obstruction due to adhesions


Assessment/Plan: 


POD #2. Explo Lap w SB Resct w anastomosis Plus Ileocolic Resct w amastomosis.l


Code(s): K56.5 - INTESTINAL ADHESIONS W OBST (POSTPROCEDURAL) (POSTINFECTION)





(5) Fever


Assessment/Plan: 


SEE ABOVE NOTES. POD#2 - afeb now on UV zosyn and flagyl. BCS neg 1-11-17 x 92 

hrs and #2 set BCS 1-14-17 NEG x 24 h.


Code(s): R50.9 - FEVER, UNSPECIFIED   Qualifiers: 


     Fever type: other        Qualified Code(s): R50.81 - Fever presenting with 

conditions classified elsewhere  





(6) Urine retention


Assessment/Plan: 


POD #2 Pt Voiding spontaneously -> RESOLVED


Code(s): R33.9 - RETENTION OF URINE, UNSPECIFIED





(7) Status post exploratory laparotomy


Assessment/Plan: 


POD #2 due to SBO due to Adhesions from Testis Ca W Abd cav and retropeit 

Adenopathy POST Left orchiectomy and node resections 2010. Clinically stable in 

MedSx floor. Disc w pt and Mother pts present clinical condition and plan of 

care;Both agreed w iit.


Code(s): Z98.89 - OTHER SPECIFIED POSTPROCEDURAL STATES * DO NOT USE *

## 2017-01-15 NOTE — PN
[id number] events per RN notes.


Doing well. Using his PCA to control his pain.


[place] cath removed yesterday and states he is voiding without difficulty. He's 

been oob but hasn't ambulated yet. Using his incentive spirometer sparingly.


Denies n/v/f/c, flatus, CP, SOB or palpitations.





 Last Vital Signs











Temp Pulse Resp BP Pulse Ox


 


 98.9 F   71   20   124/69   95 


 


 01/15/17 06:00  01/15/17 06:00  01/15/17 06:00  01/15/17 06:00  01/14/17 21:00








 CBC, BMP





 01/15/17 06:35 





 01/15/17 06:35 





PE


General: Alert. NAD


Head: NGT on LWCS (250mL/shift)


ABD: Softly distended. Midline incision open with intermittent staples. Deep 

fascia intact. Wound is clean. AMARI x2 on bulb suction (serosang)


LE: SCDs b/l. No swelling/edema/pain b/l





<Rodri Galan P - Last Filed: 01/15/17 09:14>





- Note


Progress Note: 


Agree


POD 2


Tmax 101.6


Currently afebrile


Vital signs stable


Pain controlled


NG tube in place


Abd soft, dressing changed; AMARI x 2 serosanguinous





Continue antibiotics


NG tube


OOB


Await bowel function





<Randy Perez - Last Filed: 01/15/17 10:14>





Problem List





- Problems


(1) Small bowel obstruction due to adhesions


Assessment/Plan: 


NPO/IVF


GI/DVT PPX


Encouraged to use incentive spirometer 10x every hour


OOB & ambulate


NGT to LWCS


Serial abd exams


CBC/BMP in AM


Dressing change on rounds


Monitor drain output


Awaiting bowel function to resume before starting clear liquid diet


Code(s): K56.5 - INTESTINAL ADHESIONS W OBST (POSTPROCEDURAL) (POSTINFECTION)








<Rodri Galan P - Last Filed: 01/15/17 09:14>





- Problems


(1) Fever


Code(s): R50.9 - FEVER, UNSPECIFIED   Qualifiers: 


     Fever type: other        Qualified Code(s): R50.81 - Fever presenting with 

conditions classified elsewhere  





(2) Intestinal obstruction


Code(s): K56.60 - UNSPECIFIED INTESTINAL OBSTRUCTION   Qualifiers: 


     Intestinal obstruction type: obstruction due to adhesions        Qualified 

Code(s): K56.5 - Intestinal adhesions [bands] with obstruction (postprocedural) 

(postinfection)  





(3) Small bowel obstruction due to adhesions


Code(s): K56.5 - INTESTINAL ADHESIONS W OBST (POSTPROCEDURAL) (POSTINFECTION)








<Randy Perez - Last Filed: 01/15/17 10:14>

## 2017-01-16 LAB
ANION GAP SERPL CALC-SCNC: 8 MMOL/L (ref 8–16)
BASOPHILS # BLD: 0.6 % (ref 0–2)
CALCIUM SERPL-MCNC: 7.6 MG/DL (ref 8.5–10.1)
CO2 SERPL-SCNC: 25 MMOL/L (ref 21–32)
CREAT SERPL-MCNC: 0.5 MG/DL (ref 0.7–1.3)
DEPRECATED RDW RBC AUTO: 13.4 % (ref 11.9–15.9)
EOSINOPHIL # BLD: 1.4 % (ref 0–4.5)
GLUCOSE SERPL-MCNC: 110 MG/DL (ref 74–106)
MCH RBC QN AUTO: 33 PG (ref 25.7–33.7)
MCHC RBC AUTO-ENTMCNC: 34.1 G/DL (ref 32–35.9)
MCV RBC: 96.7 FL (ref 80–96)
NEUTROPHILS # BLD: 80.9 % (ref 42.8–82.8)
PLATELET # BLD AUTO: 219 K/MM3 (ref 134–434)
PMV BLD: 7.7 FL (ref 7.5–11.1)
WBC # BLD AUTO: 10.2 K/MM3 (ref 4–10)

## 2017-01-16 RX ADMIN — ACETAMINOPHEN SCH MG: 10 INJECTION, SOLUTION INTRAVENOUS at 10:27

## 2017-01-16 RX ADMIN — PANTOPRAZOLE SODIUM SCH: 40 INJECTION, POWDER, FOR SOLUTION INTRAVENOUS at 20:34

## 2017-01-16 RX ADMIN — DEXTROSE AND SODIUM CHLORIDE SCH MLS/HR: 5; 450 INJECTION, SOLUTION INTRAVENOUS at 16:33

## 2017-01-16 RX ADMIN — PANTOPRAZOLE SODIUM SCH MLS/HR: 40 INJECTION, POWDER, FOR SOLUTION INTRAVENOUS at 22:56

## 2017-01-16 RX ADMIN — KETOROLAC TROMETHAMINE SCH: 30 INJECTION INTRAMUSCULAR; INTRAVENOUS at 16:32

## 2017-01-16 RX ADMIN — METRONIDAZOLE SCH MLS/HR: 500 INJECTION, SOLUTION INTRAVENOUS at 10:31

## 2017-01-16 RX ADMIN — METRONIDAZOLE SCH MLS/HR: 500 INJECTION, SOLUTION INTRAVENOUS at 01:55

## 2017-01-16 RX ADMIN — PANTOPRAZOLE SODIUM SCH MLS/HR: 40 INJECTION, POWDER, FOR SOLUTION INTRAVENOUS at 11:10

## 2017-01-16 RX ADMIN — ACETAMINOPHEN PRN MLS/HR: 10 INJECTION, SOLUTION INTRAVENOUS at 06:47

## 2017-01-16 RX ADMIN — PIPERACILLIN SODIUM,TAZOBACTAM SODIUM SCH MLS/HR: 3; .375 INJECTION, POWDER, FOR SOLUTION INTRAVENOUS at 01:55

## 2017-01-16 RX ADMIN — HEPARIN SODIUM SCH UNIT: 5000 INJECTION, SOLUTION INTRAVENOUS; SUBCUTANEOUS at 06:16

## 2017-01-16 RX ADMIN — KETOROLAC TROMETHAMINE SCH MG: 30 INJECTION INTRAMUSCULAR; INTRAVENOUS at 15:10

## 2017-01-16 RX ADMIN — SODIUM CHLORIDE SCH MLS/HR: 9 INJECTION, SOLUTION INTRAVENOUS at 09:35

## 2017-01-16 RX ADMIN — KETOROLAC TROMETHAMINE SCH MG: 30 INJECTION INTRAMUSCULAR; INTRAVENOUS at 22:55

## 2017-01-16 RX ADMIN — DEXTROSE AND SODIUM CHLORIDE SCH: 5; 450 INJECTION, SOLUTION INTRAVENOUS at 20:33

## 2017-01-16 RX ADMIN — ACETAMINOPHEN SCH: 10 INJECTION, SOLUTION INTRAVENOUS at 18:29

## 2017-01-16 RX ADMIN — HEPARIN SODIUM SCH: 5000 INJECTION, SOLUTION INTRAVENOUS; SUBCUTANEOUS at 20:34

## 2017-01-16 RX ADMIN — PIPERACILLIN SODIUM,TAZOBACTAM SODIUM SCH: 3; .375 INJECTION, POWDER, FOR SOLUTION INTRAVENOUS at 20:33

## 2017-01-16 RX ADMIN — KETOROLAC TROMETHAMINE SCH: 30 INJECTION INTRAMUSCULAR; INTRAVENOUS at 13:14

## 2017-01-16 RX ADMIN — HYDROMORPHONE HYDROCHLORIDE SCH: 10 INJECTION, SOLUTION INTRAMUSCULAR; INTRAVENOUS; SUBCUTANEOUS at 20:35

## 2017-01-16 RX ADMIN — PIPERACILLIN SODIUM,TAZOBACTAM SODIUM SCH: 3; .375 INJECTION, POWDER, FOR SOLUTION INTRAVENOUS at 11:08

## 2017-01-16 RX ADMIN — ENOXAPARIN SODIUM SCH MG: 40 INJECTION SUBCUTANEOUS at 10:33

## 2017-01-16 RX ADMIN — ACETAMINOPHEN SCH: 10 INJECTION, SOLUTION INTRAVENOUS at 15:00

## 2017-01-16 NOTE — PN
Progress Note (short form)





- Note


Progress Note: 


POD #2 - s/p exploratory laparotomy/small bowel resection under general 

anesthesia with dilaudid


PCA for post-op pain management. Pt.doing well, resting comfortably in bed. PCA 

discontinued and 


pain meds ordered by surgeon. Continue current care. Reconsult if needed.

## 2017-01-16 NOTE — PN
Progress Note, Physician


Chief Complaint: 


Wife at bedside. pain 5. requesting meds so he can ambulate; no flatus; + voding


History of Present Illness: 


POD#2- SEE Prev notes; Post Exp Lap on PCA,





- Current Medication List


Current Medications: 


Active Medications





Acetaminophen (Ofirmev Injection -)  1,000 mg IVPB Q6H-IV [facility]


   Stop: 01/17/17 03:01


   Last Admin: 01/16/17 10:27 Dose:  1,000 mg


Enoxaparin Sodium (Lovenox -)  40 mg SQ DAILY [facility]


   Last Admin: 01/16/17 10:33 Dose:  40 mg


Hydromorphone HCl (Dilaudid Injection -)  2 mg IVPB Q4H PRN


   PRN Reason: PAIN


Pantoprazole Sodium (Protonix 40mg Ivpb (Pre-Docked))  100 mls @ 200 mls/hr 

IVPB BID [facility]


   Last Admin: 01/16/17 11:10 Dose:  200 mls/hr


Sodium Chloride (Normal Saline -)  1,000 mls @ 150 mls/hr IV ASDIR [facility]


   Last Admin: 01/16/17 09:35 Dose:  150 mls/hr


Ketorolac Tromethamine (Toradol Injection -)  30 mg IVPB Q6H-IV [facility]


   Stop: 01/21/17 12:59


   Last Admin: 01/16/17 13:14 Dose:  Not Given











- Objective


Vital Signs: 


 Vital Signs











Temperature  98.6 F   01/16/17 10:00


 


Pulse Rate  64   01/16/17 10:00


 


Respiratory Rate  18   01/16/17 10:00


 


Blood Pressure  121/64   01/16/17 10:00


 


O2 Sat by Pulse Oximetry (%)  93 L  01/16/17 09:00











Constitutional: Yes: Anxious, Mild Distress (assoc w changes in pain mgmt; pt 

concerned re schedule; wants to ambulate but req prior pain meds to be able to 

tolerate ambulation.)


Neck: Yes: Supple


Cardiovascular: Yes: Regular Rate and Rhythm


Respiratory: Yes: Regular, CTA Bilaterally


Gastrointestinal: Yes: Soft, Hypoactive Bowel Sounds (heard all fields), Other (

No Gding No Rebd)


...Rectal Exam: Yes: Deferred


Musculoskeletal: Yes: WNL


Edema: No


Peripheral Pulses: Left Doralis Pedis: 2+, Right Dorsalis Pedis: 2+


Integumentary: Yes: WNL


Wound/Incision: Yes: Dressing Dry and Intact


Neurological: Yes: Alert, Oriented (x3)


...Motor Strength: WNL


Psychiatric: Yes: Alert, Oriented, Other (anxious assoc w pain and pain med 

administration chnges)


Labs: 


 CBC, BMP





 01/16/17 06:35 





 01/16/17 06:35 





 INR, PTT











INR  1.79  (0.82-1.09)  H  01/11/17  20:15    








 Laboratory Last Values











WBC  10.2 K/mm3 (4.0-10.0)  H  01/16/17  06:35    


 


RBC  3.04 M/mm3 (4.00-5.60)  L  01/16/17  06:35    


 


Hgb  10.0 GM/dL (11.7-16.9)  L D 01/16/17  06:35    


 


Hct  29.4 % (35.4-49)  L  01/16/17  06:35    


 


MCV  96.7 fl (80-96)  H  01/16/17  06:35    


 


MCHC  34.1 g/dl (32.0-35.9)   01/16/17  06:35    


 


RDW  13.4 % (11.9-15.9)   01/16/17  06:35    


 


Plt Count  219 K/MM3 (134-434)   01/16/17  06:35    


 


MPV  7.7 fl (7.5-11.1)   01/16/17  06:35    


 


Neutrophils %  80.9 % (42.8-82.8)   01/16/17  06:35    


 


Lymphocytes %  7.9 % (8-40)  L D 01/16/17  06:35    


 


Monocytes %  9.2 % (3.8-10.2)   01/16/17  06:35    


 


Eosinophils %  1.4 % (0-4.5)  D 01/16/17  06:35    


 


Basophils %  0.6 % (0-2.0)   01/16/17  06:35    


 


INR  1.79  (0.82-1.09)  H  01/11/17  20:15    


 


PTT (Actin FS)  34.4 SECONDS (24.0-38.9)   01/11/17  20:15    


 


Sodium  147 mmol/L (136-145)  H  01/16/17  06:35    


 


Potassium  3.7 mmol/L (3.5-5.1)   01/16/17  06:35    


 


Chloride  114 mmol/L ()  H  01/16/17  06:35    


 


Carbon Dioxide  25 mmol/L (21-32)   01/16/17  06:35    


 


Anion Gap  8  (8-16)   01/16/17  06:35    


 


BUN  14 mg/dL (7-18)   01/16/17  06:35    


 


Creatinine  0.5 mg/dL (0.7-1.3)  L D 01/16/17  06:35    


 


Creat Clearance w eGFR  > 60  (>60)   01/12/17  07:53    


 


Random Glucose  110 mg/dL ()  H  01/16/17  06:35    


 


Lactic Acid  0.847 mmol/L (0.4-2.0)   01/13/17  07:00    


 


Calcium  7.6 mg/dL (8.5-10.1)  L  01/16/17  06:35    


 


Phosphorus  1.6 mg/dL (2.5-4.9)  L  01/15/17  06:35    


 


Magnesium  2.4 mg/dL (1.8-2.4)   01/15/17  06:35    


 


Total Bilirubin  1.4 mg/dl (0.2-1.0)  H D 01/12/17  07:53    


 


AST  28 U/L (10-42)   01/12/17  07:53    


 


ALT  12 U/L (10-40)   01/12/17  07:53    


 


Alkaline Phosphatase  58 U/L (32-92)   01/12/17  07:53    


 


Creatine Kinase  55 IU/L ()   01/11/17  15:00    


 


Troponin I  < 0.03 ng/ml (0.00-0.05)   01/11/17  20:15    


 


Total Protein  5.5 g/dl (6.4-8.3)  L  01/12/17  07:53    


 


Albumin  3.4 g/dl (3.5-5.0)  L  01/12/17  07:53    


 


Total Amylase  42 U/L ()   01/11/17  20:15    


 


Lipase  17 U/L (22-51)  L  01/11/17  20:15    


 


Urine Color  Yellow   01/11/17  12:53    


 


Urine Appearance  Clear   01/11/17  12:53    


 


Urine pH  6.0  (4.5-8)   01/11/17  12:53    


 


Ur Specific Gravity  1.010  (1.005-1.025)   01/11/17  12:53    


 


Urine Protein  2+  (NEGATIVE)  H  01/11/17  12:53    


 


Urine Glucose (UA)  Negative  (NEGATIVE)   01/11/17  12:53    


 


Urine Ketones  Trace  (NEGATIVE)   01/11/17  12:53    


 


Urine Blood  3+  (NEGATIVE)  H  01/11/17  12:53    


 


Urine Nitrite  Negative  (NEGATIVE)   01/11/17  12:53    


 


Urine Bilirubin  1+  (NEGATIVE)  H  01/11/17  12:53    


 


Urine Urobilinogen  2.0 e.u/dl  (0.2-1.0)   01/11/17  12:53    


 


Ur Leukocyte Esterase  Negative  (NEGATIVE)   01/11/17  12:53    


 


Urine RBC  5-10 /hpf (0-3)   01/11/17  12:53    


 


Urine WBC  0-2  (3-5)   01/11/17  12:53    


 


Urine Bacteria  Few /hpf (NEGATIVE)   01/11/17  12:53    


 


Blood Type  O POSITIVE   01/14/17  00:01    


 


Antibody Screen  Negative   01/13/17  23:00    








BCS 1- X2 NEG x 5 days


BCS 1- x2 NEG x 48 hrs


** Hgb 10 - 1-16-17


 Current Medications











Generic Name Dose Route Start Last Admin





  Trade Name Freq  PRN Reason Stop Dose Admin


 


Acetaminophen  1,000 mg 01/16/17 09:00 01/16/17 10:27





  Ofirmev Injection -  IVPB 01/17/17 03:01  1,000 mg





  Q6H-IV DEIDRA   Administration


 


Enoxaparin Sodium  40 mg 01/16/17 10:00 01/16/17 10:33





  Lovenox -  SQ   40 mg





  DAILY DEIDRA   Administration


 


Hydromorphone HCl  2 mg 01/16/17 07:44  





  Dilaudid Injection -  IVPB   





  Q4H PRN   





  PAIN   


 


Pantoprazole Sodium  100 mls @ 200 mls/hr 01/14/17 22:00 01/16/17 11:10





  Protonix 40mg Ivpb (Pre-Docked)  IVPB   200 mls/hr





  BID DEIDRA   Administration


 


Sodium Chloride  1,000 mls @ 150 mls/hr 01/14/17 19:12 01/16/17 09:35





  Normal Saline -  IV   150 mls/hr





  ASDIR DEIDRA   Administration


 


Ketorolac Tromethamine  30 mg 01/16/17 13:00 01/16/17 15:10





  Toradol Injection -  IVPB 01/21/17 12:59  30 mg





  Q6H-IV DEIDRA   Administration














Problem List





- Problems


(1) Testicle cancer


Assessment/Plan: 


Testis CA 2010 w Node Resction in Abd and Retroperitoneal areas with NL eval 

2016 


Code(s): C62.90 - MALIG NEOPLASM OF UNSP TESTIS, UNSP DESCENDED OR UNDESCENDED 

  





(2) Synechia, posterior


Assessment/Plan: 


post Above mentioned sx 2010; + extensive seen intraop.


Code(s): H21.549 - POSTERIOR SYNECHIAE (IRIS), UNSPECIFIED EYE   





(3) Ankylosing spondylitis


Assessment/Plan: 


chronic on meds pre admission- stable.


Code(s): M45.9 - ANKYLOSING SPONDYLITIS OF UNSPECIFIED SITES IN SPINE





(4) Small bowel obstruction due to adhesions


Assessment/Plan: 


POD #2. (1-13-17)Explo Lap w SB Resct w anastomosis Plus Ileocolic Resct w 

amastomosis.l


Code(s): K56.5 - INTESTINAL ADHESIONS W OBST (POSTPROCEDURAL) (POSTINFECTION)





(5) Fever


Assessment/Plan: 


SEE ABOVE NOTES. POD#2 - afeb now on UV zosyn and flagyl. BCS neg 1-11-17 x 5d 

and #2 sets BCS 1-14-17 NEG x 48 h.


Code(s): R50.9 - FEVER, UNSPECIFIED   Qualifiers: 


     Fever type: other        Qualified Code(s): R50.81 - Fever presenting with 

conditions classified elsewhere  





(6) Status post exploratory laparotomy


Assessment/Plan: 


POD #2 (1-)due to SBO due to Adhesions from Testis Ca W Abd cav and 

retropeit Adenopathy POST Left orchiectomy and node resections 2010. Clinically 

stable in MedSx floor. Disc w Wife and ot.


Code(s): Z98.89 - OTHER SPECIFIED POSTPROCEDURAL STATES * DO NOT USE *





(7) Pain management


Assessment/Plan: 


Pain mgmt by Surgeon; disc w patient and wife the pain mgmt plan: Tylenon IV Q 

6hr sched , Toradol IV q 6 hrs sched and Dilaudid IV Q 4hrs prn. Pt verb 

understanding. Instructed pt to ambulate once pain has improved, Only - he 

agreed.


Code(s): R52 - PAIN, UNSPECIFIED





(8) Discharge planning issues


Assessment/Plan: 


Anticipate pt will be dc home once pt has Flatus, BM and able to tolerate diet. 

Defer Final DC Home clearance to Surgeon.


Code(s): Z02.9 - ENCOUNTER FOR ADMINISTRATIVE EXAMINATIONS, UNSPECIFIED





(9) Postoperative anemia due to chronic blood loss


Assessment/Plan: 


post Sx 1-13-17- Hgb trending down- will cont to monitor ; anticipate will 

stabilize in next day or so. If not then will disc w Surgeon and Transfuse 1 ut 

IF hgb <9


Code(s): D50.0 - IRON DEFICIENCY ANEMIA SECONDARY TO BLOOD LOSS (CHRONIC)





(10) Hypernatremia


Assessment/Plan: 


change IVF from NS to D5I .045 NSS; Will Cont to monitor


Code(s): E87.0 - HYPEROSMOLALITY AND HYPERNATREMIA

## 2017-01-16 NOTE — OP
DATE OF OPERATION:  01/13/2017

 

PREOPERATIVE DIAGNOSIS:  High-grade bowel obstruction.

 

POSTOPERATIVE DIAGNOSIS:  High-grade bowel obstruction, extensive intraabdominal

adhesions, and a necrotic diverticulum-appearing structure of the mid jejunum. 

 

OPERATION:  Diagnostic laparoscopy, exploratory laparotomy, extensive lysis of

adhesions, small-bowel resection with anastomosis, ileocolic resection with

anastomosis, and a component separation.

 

SURGEON:  Pily Perez MD

 

ASSISTANT:  PANKAJ Valladares

 

ANESTHESIA:  GET.

 

SPECIMEN:  Small bowel, ileum/cecum/appendix, adhesive band.

 

ESTIMATED BLOOD LOSS:  300 mL.

 

DRAINS:  NG tube, De Los Santos, Ilan-Dhaliwal drain x2.

 

REASON FOR PROCEDURE:  This is a 51-year-old gentleman with a history of right

testicular cancer, status post resection, with an orchiectomy and a 
retroperitoneal

lymph node dissection done at [facility] 6 years ago.  He 
presented to

the hospital with abdominal pain and distention.  He was noted to have a bowel

obstruction on clinical and radiologic findings.  He was treated conservatively 
with

NG tube decompression and serial abdominal exams, however, despite conservative

measures, the patient was noted to have a lack of progression of bowel 
function.  In

addition, he had a fever.  Because of this, the conservative measures were 
shown to

have failed and he needed surgical intervention.  He was consented for a 
diagnostic

laparoscopy, possible exploratory laparotomy, possible bowel resection, possible

ostomy.  The risks and benefits of the procedure were explained.  These included

bleeding, infection, hernia, MI, DVT, PE, injury to surrounding abdominal 
structures

including bowel, colon, liver, stomach, spleen, bladder, ureter, vessel injury, 
nerve

injury, abscess formation, anastomotic dehiscence and leak, fascial dehiscence,

evisceration, and death, as some of the complications.  The patient understood 
and

signed informed consent.

 

DESCRIPTION OF PROCEDURE:  The patient was placed supine on the operating room 
table.

 He underwent general endotracheal intubation.  De Los Santos catheter was inserted.  
The

abdomen was prepped and draped in the usual sterile fashion.  Time-out was 
performed.

 An incision was made in the left upper quadrant and a 5-mm optical trocar was 
placed

under direct visualization with the laparoscope.  Pneumoperitoneum was 
established. 

Immediately on inspection of the abdominal cavity, it was noted that the bowel 
was

extensively dilated and extensively adherent to the abdominal wall at its 
previous

midline incision.  Because of this, it was noted that laparoscopic approach was 
not

feasible.  Because of this, a midline incision was made through his previous 
incision

from the xiphoid to pubis.  The incision began from the supraumbilical to the

infraumbilical area and was extended as needed.  Careful dissection was 
performed

through the skin and subcutaneous tissues down to the level of the fascia to 
avoid

injury to bowel adherent to the abdominal wall.  Entrance to the abdominal 
cavity was

carefully attained.  Again, extensive adhesions were noted from bowel to bowel, 
bowel

to the pelvis, and bowel to the abdominal wall.  The adhesions to the abdominal 
wall

were carefully lysed, freeing the bowel from it.  This was extensive and took an

extensive amount of time.  In addition, the bowel was freed from other aspects 
of the

bowel, carefully lysing adhesions as well as freeing the bowel from the pelvis, 
again

lysing further adhesions.  There were noted to be adhesions throughout his

intraabdominal cavity.  Once all adhesions were lysed, the bowel was 
eviscerated. 



The bowel was run from the ligament of Treitz down to the cecum.  In the mid 
jejunum,

it was noted that there was a diverticular type structure extending off of the 
mid

jejunum, which was necrotic in nature.  The small bowel at this area was 
resected,

including the diverticular-appearing structure as a specimen.  Windows were 
created

in the mesentery with electrocautery and the two ends of the bowel were stapled 
using

Endo CHELSEA stapler with blue loads.  The mesentery was ligated with a LigaSure 
device. 

The diverticulum was noted to be adherent to a separate part of the small bowel 
which

was further down in the distal ileum.  Because of the adherence to the 
diverticulum

and the inability to free the two structures from each other as well as a 
second area of

thickening in the terminal ileum causing obstruction, a second

transection needed to be performed.  This was done from the aspect of the distal

ileum extending beyond both the connection with the diverticular type structure 
as

well as the thickening of the terminal ileum towards the cecum.  A window was 
created

in the mesentery of the distal ileum proximal to these structures and the bowel

stapled with Endo CHELSEA stapler with blue load.  The cecum was freed and the right

colon mobilized medially by freeing the bowel on the white line of Toldt.  At 
this

point, the appendix was also noted to be extending from the juncture of the 
terminal

ileum and cecum.  This was extending toward the iliac vessels and was densely

adherent to them.  It was not possible to dissect the appendix off of the iliac

vessels without creating major vessel injury.  Because of this and because of 
the

need to mobilize the cecum and transect it, the appendix was stapled as distal 
as

possible but without injuring the iliac vessels.  At this point, the cecum was 
fully

mobilized.  A window was created within the mesentery and the cecum was 
transected

using Endo CHELSEA stapler with blue load.  The mesentery of the ileocolic region 
was

ligated using a LigaSure device.  Both specimens were sent off the field.  In

addition, on inspection of the bowel, there was an adhesive band noted 
connected to

the region of the sigmoid colon, which was also thickened.  This was transected 
from

the sigmoid colon using Endo CHELSEA stapler with blue load as well and sent off as

specimen.  The entirety of the bowel was again run from the ligament of Treitz 
to the

cecum and back and no further sources of obstruction were noted on multiple

inspections.  The right colon, the transverse colon, the left colon was noted 
to be

grossly normal, and again the sigmoid was within normal limits, except for the

thickened area.  It was noted to be palpably patent.  The area of the first 
transection

of the mid jejunum was then reanastomosed.  3-0 silk sutures were used to appose

the two ends of the bowel.  These were noted to be without twist or kink and 
without

any tension.  The ends of the staple lines were transected and the anastomosis 
was

created using the Endo CHELSEA stapler with blue load.  The common enterotomy was 
closed

using a TA stapler.  This common enterotomy was oversewn with multiple 3-0 silk

sutures, imbricating the staple line.  The staple line was fully inspected and 
noted

to be intact.  Hemostasis was also noted.  The anastomosis was noted to be 
widely

patent.  The mesenteric defect was closed using a 2-0 Ethibond suture. 



The area of

the ileocolic resection was then inspected.  The distal ileum was then mobilized

towards the right colon.  The end of the distal ileum and the right colon were 
then

approximated to each other using 3-0 silk sutures.  Again, no kinking or 
twisting was

noted, and again no tension was noted.  The ends of the staple lines were 
excised and

transected and the anastomosis created using Endo CHELSEA stapler with blue load.  
Allis

clamps were applied and the common enterotomy was closed using a TA stapler.  
The

staple line was oversewn using multiple 3-0 silk sutures.  The staple line was 
again

noted to be fully intact and hemostasis noted.  The anastomosis was noted to be

widely patent.  At this point the bowel was again run from the ligament of 
Treitz to

beyond the anastomosis in the right colon and back towards the ligament of 
Treitz. 

Again, no further areas of obstruction or other gross anatomical anomalies 
noted.  At

this point the bowel was placed back within the abdominal cavity.  Copious 
irrigation

and suction was performed with multiple liters of fluid.  Again, because of the

necrotic-appearing diverticular structure and purulent-like appearing tissue on 
the

adherent bowel to the abdominal wall, this was a contaminated case.  Multiple

liters of fluid were used to irrigate and suction the abdominal cavity until 
fully clear.  The anastomoses

again were inspected and noted to be fully intact.  A flat Ilan-Dhaliwal drain 
was

then placed in the left intraabdominal cavity through the initial left upper 
quadrant

incision made for the 5-mm laparoscopic trocar, which had been removed at 
beginning

of the exploratory laparotomy.  This was secured in place with a 3-0 silk 
suture. 

The fascia was then grasped with Kocher clamps and were attempted to be 
approximated,

however, because of tension, this was unable to be performed.  In order to get 
the two

edges of the fascia to approximate to each other under no tension, a component

separation had to be performed.  The deep subcutaneous tissue overlying the 
fascia

was dissected all the way laterally on both ends, as well as both superiorly 
and inferiorly,

completely freeing the fascia from its attachments.  Once fully performed, 
hemostasis

was achieved.  At the end, the fascial edges were noted to approximate to each 
other

under no tension.  The fascia was then closed using multiple interrupted number 
1

Prolene sutures in figure-of-eight fashion.  At the end, the fascia was noted 
to be

fully closed and under no tension.  Because of the dead space left by the 
extensive

dissection above the fascia, another AMARI drain was placed over the level of the 
fascia

and exteriorized on the right side of the abdomen through a stab wound.  This 
was

secured using a 3-0 silk suture as well.  The skin was loosely closed using 
staples and

2-inch iodoform packing was placed in between.  The AMARI drains were placed to

self-suction, and sterile dressings were applied.  The patient tolerated the

procedure well, was transferred to ICU for careful monitoring.  Description of 
this

was explained both him and his wife once he was extubated in the ICU.

 

 

PILY PEREZ M.D.

 

CRISTINA/[id number]

DD: 01/16/2017 11:55

DT: 01/16/2017 16:28

Job #:  [id number]

MTDD

## 2017-01-16 NOTE — PN
[id number] control via PCA. 


Ambulated hallways 3x yesterday. Voiding spontaneously. Using his incentive 

spirometer.


Denies n/v/f/c, flatus, CP, SOB or palpitations.





 Last Vital Signs











Temp Pulse Resp BP Pulse Ox


 


 98.9 F   91 H  20   147/93   95 


 


 01/15/17 20:00  01/16/17 06:00  01/16/17 06:00  01/16/17 06:00  01/15/17 21:00








 OUTPUT











  01/15/17 01/15/17 01/15/17





  06:00 15:00 19:01


 


Left AMARI  50 25


 


NG Tube 250  200


 


Right AMARI   10








PE


General: NAD


Pulm: CTA b/l anteriorly


Cor: rrr


Abd: Soft, less distended compared to yesterday. Midline incision open with 

intermittent staples intact. Packing removed. Fascia intact. Clean.


LE: SCDs b/l. Soft, NT





<Rodri Galan P - Last Filed: 01/16/17 07:42>





- Note


Progress Note: 


Agree 


POD 3


Pain controlled with medication


No nausea/vomiting


No flatus


NG tube in place


Afebrile


VS stable


Abd soft, packing changed, wound clean


AMARI x 2 serosanguinous





Continue antibiotics


Clamp NG tube and check residuals


D/C PCA


IV Dilaudid/Toradol/Ofirmev


OOB


Await bowel function


Await pathology





<Randy Perez - Last Filed: 01/16/17 08:35>





Problem List





- Problems


(1) Small bowel obstruction due to adhesions


Assessment/Plan: 


POD #3 s/p Dx --> Exlap lap, angy, sb resxn w/ anastomosis, ileocolic resxn w/ 

anastomosis & component seperation





NPO / IVF


GI DVT PPX


NG Tube to remain until he passes flatus


Awaiting bowel function to resume before starting liquid diet


PCA dc'd


Dilaudid 2mg q4h PRN ordered


Ofirmev 1000mg q6h


Incentive spirometer


Monitor AMARI/NGT output


CBC, BMP


OOB and ambulate


Code(s): K56.5 - INTESTINAL ADHESIONS W OBST (POSTPROCEDURAL) (POSTINFECTION)








<Rodri Galan P - Last Filed: 01/16/17 07:42>





- Problems


(1) Fever


Code(s): R50.9 - FEVER, UNSPECIFIED   Qualifiers: 


     Fever type: other        Qualified Code(s): R50.81 - Fever presenting with 

conditions classified elsewhere  





(2) Intestinal obstruction


Code(s): K56.60 - UNSPECIFIED INTESTINAL OBSTRUCTION   Qualifiers: 


     Intestinal obstruction type: obstruction due to adhesions        Qualified 

Code(s): K56.5 - Intestinal adhesions [bands] with obstruction (postprocedural) 

(postinfection)  





(3) Small bowel obstruction due to adhesions


Code(s): K56.5 - INTESTINAL ADHESIONS W OBST (POSTPROCEDURAL) (POSTINFECTION)








<Randy Perez - Last Filed: 01/16/17 08:35>

## 2017-01-16 NOTE — PN
Progress Note, Physician


Chief Complaint: 


s/p ex lap under general anesthesia post op day 1


History of Present Illness: 


dilaudid pca for post op pain control





- Current Medication List


Current Medications: 


Active Medications





Heparin Sodium (Porcine) (Heparin -)  5,000 unit SQ TID [facility]


   Last Admin: 01/16/17 06:16 Dose:  5,000 unit


Hydromorphone HCl (Dilaudid Pca -)  0 mg PCA PCA [facility]


   PRN Reason: Protocol


   Stop: 01/17/17 02:06


   Last Admin: 01/15/17 19:03 Dose:  Not Given


Acetaminophen (Ofirmev Injection -)  65 mls @ 260 mls/hr IVPB Q4H PRN


   PRN Reason: PAIN/FEVER


   Last Admin: 01/16/17 06:47 Dose:  260 mls/hr


Metronidazole (Flagyl 500mg Premixed Ivpb -)  100 mls @ 100 mls/hr IVPB Q8H-IV 

[facility]


   Last Admin: 01/16/17 01:55 Dose:  100 mls/hr


Pantoprazole Sodium (Protonix 40mg Ivpb (Pre-Docked))  100 mls @ 200 mls/hr 

IVPB BID [facility]


   Last Admin: 01/15/17 21:33 Dose:  200 mls/hr


Piperacillin Sod/Tazobactam Sod (Zosyn 3.375gm Ivpb (Pre-Docked))  50 mls @ 100 

mls/hr IVPB Q8H-IV [facility]


   Last Admin: 01/16/17 01:55 Dose:  100 mls/hr


Sodium Chloride (Normal Saline -)  1,000 mls @ 150 mls/hr IV ASDIR [facility]


   Last Admin: 01/15/17 19:02 Dose:  150 mls/hr


Ondansetron HCl (Zofran Injection)  4 mg IVPB Q4H PRN


   PRN Reason: NAUSEA AND/OR VOMITING











- Objective


Vital Signs: 


 Vital Signs











Temperature  98.9 F   01/15/17 20:00


 


Pulse Rate  91 H  01/16/17 06:00


 


Respiratory Rate  20   01/16/17 06:00


 


Blood Pressure  147/93   01/16/17 06:00


 


O2 Sat by Pulse Oximetry (%)  95   01/15/17 21:00











Constitutional: Yes: Well Nourished, Mild Distress


Cardiovascular: Yes: WNL


Respiratory: Yes: WNL


Gastrointestinal: Yes: Distention


Labs: 


 INR, PTT











INR  1.79  (0.82-1.09)  H  01/11/17  20:15    














Assessment/Plan


Patient reports feeling better today than post op day 0, using thePCA which 

helps with pain, NGT in place, no nausea, will continue PCA until NGT is out.  

Dept of anesthesia will continue to monitor pain control

## 2017-01-16 NOTE — PN
Progress Note (short form)





- Note


Progress Note: 


ID





Day 3 Exploratory lap  Doing well post op but still on antibiotics


 Selected Entries











  01/16/17





  06:00


 


Temperature 98.8 F


 


Pulse Rate 98 H


 


Respiratory 20





Rate 


 


Blood Pressure 137/91








Abd MIdline incision with staples  Soft belly


Microbiology





01/11/17 12:53   Urine - Urine Clean Catch   Urine Culture - Final


                              NO GROWTH OBTAINED


01/14/17 10:20   Blood - Peripheral Venous   Blood Culture - Preliminary


                              NO GROWTH OBTAINED AFTER 48 HOURS, INCUBATION TO 

CONTINUE


                              FOR 3 DAYS.


01/14/17 10:20   Blood - Peripheral Venous   Blood Culture - Preliminary


                              NO GROWTH OBTAINED AFTER 48 HOURS, INCUBATION TO 

CONTINUE


                              FOR 3 DAYS.


01/11/17 11:33   Blood - Peripheral Venous   Blood Culture - Preliminary


                              NO GROWTH OBTAINED AFTER 96 HOURS, INCUBATION TO 

CONTINUE


                              FOR 1 DAYS.


01/11/17 11:33   Blood - Peripheral Venous   Blood Culture - Preliminary


                              NO GROWTH OBTAINED AFTER 96 HOURS, INCUBATION TO 

CONTINUE


                              FOR 1 DAYS.





 Laboratory Tests











  01/16/17 01/16/17





  06:35 06:35


 


WBC  10.2 H 


 


Hgb  10.0 L D 


 


Hct  29.4 L 


 


Plt Count  219 


 


BUN   14


 


Creatinine   0.5 L D








Assessment Post exploratory lap day 3 post op NO fever now





Plan                Stop antibiotics observe only


Hero SAXENA

## 2017-01-17 LAB
ANION GAP SERPL CALC-SCNC: 6 MMOL/L (ref 8–16)
BASOPHILS # BLD: 0.7 % (ref 0–2)
CALCIUM SERPL-MCNC: 8.1 MG/DL (ref 8.5–10.1)
CO2 SERPL-SCNC: 26 MMOL/L (ref 21–32)
CREAT SERPL-MCNC: 0.6 MG/DL (ref 0.7–1.3)
DEPRECATED RDW RBC AUTO: 13.4 % (ref 11.9–15.9)
EOSINOPHIL # BLD: 2.5 % (ref 0–4.5)
GLUCOSE SERPL-MCNC: 126 MG/DL (ref 74–106)
MCH RBC QN AUTO: 32.5 PG (ref 25.7–33.7)
MCHC RBC AUTO-ENTMCNC: 33.8 G/DL (ref 32–35.9)
MCV RBC: 96.1 FL (ref 80–96)
NEUTROPHILS # BLD: 78.3 % (ref 42.8–82.8)
PLATELET # BLD AUTO: 271 K/MM3 (ref 134–434)
PMV BLD: 8 FL (ref 7.5–11.1)
WBC # BLD AUTO: 9.6 K/MM3 (ref 4–10)

## 2017-01-17 RX ADMIN — DEXTROSE AND SODIUM CHLORIDE SCH MLS/HR: 5; 450 INJECTION, SOLUTION INTRAVENOUS at 07:08

## 2017-01-17 RX ADMIN — DEXTROSE AND SODIUM CHLORIDE SCH: 5; 450 INJECTION, SOLUTION INTRAVENOUS at 15:45

## 2017-01-17 RX ADMIN — ENOXAPARIN SODIUM SCH MG: 40 INJECTION SUBCUTANEOUS at 09:48

## 2017-01-17 RX ADMIN — POTASSIUM CHLORIDE SCH MLS/HR: 7.46 INJECTION, SOLUTION INTRAVENOUS at 14:33

## 2017-01-17 RX ADMIN — ACETAMINOPHEN SCH MG: 10 INJECTION, SOLUTION INTRAVENOUS at 11:37

## 2017-01-17 RX ADMIN — HYDROMORPHONE HYDROCHLORIDE PRN MG: 2 INJECTION, SOLUTION INTRAMUSCULAR; INTRAVENOUS; SUBCUTANEOUS at 12:30

## 2017-01-17 RX ADMIN — KETOROLAC TROMETHAMINE SCH MG: 30 INJECTION INTRAMUSCULAR; INTRAVENOUS at 09:44

## 2017-01-17 RX ADMIN — KETOROLAC TROMETHAMINE SCH MG: 30 INJECTION INTRAMUSCULAR; INTRAVENOUS at 21:45

## 2017-01-17 RX ADMIN — PANTOPRAZOLE SODIUM SCH MLS/HR: 40 INJECTION, POWDER, FOR SOLUTION INTRAVENOUS at 10:44

## 2017-01-17 RX ADMIN — KETOROLAC TROMETHAMINE SCH MG: 30 INJECTION INTRAMUSCULAR; INTRAVENOUS at 16:00

## 2017-01-17 RX ADMIN — ACETAMINOPHEN SCH MG: 10 INJECTION, SOLUTION INTRAVENOUS at 05:48

## 2017-01-17 RX ADMIN — PANTOPRAZOLE SODIUM SCH MLS/HR: 40 INJECTION, POWDER, FOR SOLUTION INTRAVENOUS at 22:59

## 2017-01-17 RX ADMIN — KETOROLAC TROMETHAMINE SCH MG: 30 INJECTION INTRAMUSCULAR; INTRAVENOUS at 02:25

## 2017-01-17 RX ADMIN — ACETAMINOPHEN SCH MG: 10 INJECTION, SOLUTION INTRAVENOUS at 00:29

## 2017-01-17 RX ADMIN — POTASSIUM CHLORIDE SCH MLS/HR: 7.46 INJECTION, SOLUTION INTRAVENOUS at 13:18

## 2017-01-17 NOTE — PN
Progress Note, Physician


Chief Complaint: 


pain much better ; only on prn IV YToradol q 6 hrs and IV Dilaudid q 4 hr prn; 

N w V x1 at 6am little amt; BMx 2 w/o Flatus per pt; NGT stil inpalce .


History of Present Illness: 


POD#3-  Post Exp Lap on PCA, see above





- Current Medication List


Current Medications: 


Active Medications





Enoxaparin Sodium (Lovenox -)  40 mg SQ DAILY [facility]


   Last Admin: 01/17/17 09:48 Dose:  40 mg


Hydromorphone HCl (Dilaudid Injection -)  2 mg IVPB Q4H PRN


   PRN Reason: PAIN


   Last Admin: 01/17/17 12:30 Dose:  2 mg


Pantoprazole Sodium (Protonix 40mg Ivpb (Pre-Docked))  100 mls @ 200 mls/hr 

IVPB BID [facility]


   Last Admin: 01/17/17 10:44 Dose:  200 mls/hr


Dextrose/Sodium Chloride (D5-1/2ns -)  1,000 mls @ 83 mls/hr IV ASDIR [facility]


   Last Admin: 01/17/17 15:45 Dose:  Not Given


Ketorolac Tromethamine (Toradol Injection -)  30 mg IVPB Q6H [facility]


   Stop: 01/21/17 14:59


   Last Admin: 01/17/17 16:00 Dose:  30 mg











- Objective


Vital Signs: 


 Vital Signs











Temperature  98.7 F   01/17/17 15:34


 


Pulse Rate  65   01/17/17 15:34


 


Respiratory Rate  20   01/17/17 15:34


 


Blood Pressure  133/77   01/17/17 15:34


 


O2 Sat by Pulse Oximetry (%)  95   01/17/17 09:00











Constitutional: Yes: No Distress, Calm


Neck: Yes: Supple


Cardiovascular: Yes: Regular Rate and Rhythm


Respiratory: Yes: Regular, CTA Bilaterally


Gastrointestinal: Yes: Normal Bowel Sounds, Soft, Tenderness (minimal in area 

surrounding wound only NOGDING)


...Rectal Exam: Yes: Deferred


Musculoskeletal: Yes: WNL


Edema: No


Peripheral Pulses: Left Doralis Pedis: 1+, Left Femoral: 1+


Integumentary: Yes: WNL


Wound/Incision: Yes: Dressing Dry and Intact


Neurological: Yes: Alert, Oriented, Other (none focal)


...Motor Strength: WNL


Psychiatric: Yes: Alert, Oriented


Labs: 


 CBC, BMP





 01/17/17 06:25 





 01/17/17 06:25 





 INR, PTT











INR  1.79  (0.82-1.09)  H  01/11/17  20:15    








MG 2.3 post supplement


BCS x2 NEG x 72 Hrs





Problem List





- Problems


(1) Testicle cancer


Assessment/Plan: 


Testis CA 2010 w Node Resction in Abd and Retroperitoneal areas with NL eval 

2016 


Code(s): C62.90 - MALIG NEOPLASM OF UNSP TESTIS, UNSP DESCENDED OR UNDESCENDED 

  





(2) Synechia, posterior


Assessment/Plan: 


post Above mentioned sx 2010; + extensive seen intraop.


Code(s): H21.549 - POSTERIOR SYNECHIAE (IRIS), UNSPECIFIED EYE   





(3) Ankylosing spondylitis


Assessment/Plan: 


chronic on meds pre admission- stable.


Code(s): M45.9 - ANKYLOSING SPONDYLITIS OF UNSPECIFIED SITES IN SPINE





(4) Small bowel obstruction due to adhesions


Assessment/Plan: 


POD #3 (1-13-17)Explo Lap w SB Resct w anastomosis Plus Ileocolic Resct w 

amastomosis.l; +BM x2 on Clear liquid diet; still a little nausea (V x1 at 6 am

) so py lo amt food taken- assoc w pain meds and NGT- Still in place until full 

tolerance of oral foods- agree w Surgeon- pt agrees w management as well. 

Clinically improving. DC NGT per Surgeon


Code(s): K56.5 - INTESTINAL ADHESIONS W OBST (POSTPROCEDURAL) (POSTINFECTION)





(5) Fever


Assessment/Plan: 


SEE ABOVE NOTES. POD#3 - afeb now on UV zosyn and flagyl.  #2 sets BCS 1-14-17 

NEG x 72 h.


Code(s): R50.9 - FEVER, UNSPECIFIED   Qualifiers: 


     Fever type: other        Qualified Code(s): R50.81 - Fever presenting with 

conditions classified elsewhere  





(6) Status post exploratory laparotomy


Assessment/Plan: 


POD #3 (1-)due to SBO due to Adhesions from Testis Ca W Abd cav and 

retropeit Adenopathy POST Left orchiectomy and node resections 2010. Clinically 

stable in [facility] floor. Disc present mgmt w pt -ageed w plan; satsfied w 

progresssion.Evidence of clinical improving w resolution of acutes dis.


Code(s): Z98.89 - OTHER SPECIFIED POSTPROCEDURAL STATES * DO NOT USE *





(7) Pain management


Assessment/Plan: 


Changed by Surgeon; pt responding well, able to ambulate and pain IS 

controlled..


Code(s): R52 - PAIN, UNSPECIFIED





(8) Discharge planning issues


Assessment/Plan: 


Anticipate pt will be dc home once pt has Flatus, BM and able to tolerate diet. 

Defer Final DC Home clearance to Surgeon.


Code(s): Z02.9 - ENCOUNTER FOR ADMINISTRATIVE EXAMINATIONS, UNSPECIFIED





(9) Postoperative anemia due to chronic blood loss


Assessment/Plan: 


post Sx 1-13-17- Hgb trending down- will cont to monitor ; anticipate will 

stabilize in next day or so. If not then will disc w Surgeon and Transfuse 1 ut 

IF hgb <9 TODAY Hgb stable at 10- will cont to monitor


Code(s): D50.0 - IRON DEFICIENCY ANEMIA SECONDARY TO BLOOD LOSS (CHRONIC)





(10) Hypernatremia


Assessment/Plan: 


change IVF from NS to D5I .045 NSS; Will Cont to monitor - TODAY Na 144 . will 

cont monitor.


Code(s): E87.0 - HYPEROSMOLALITY AND HYPERNATREMIA





(11) Hypokalemia


Assessment/Plan: 


3.today; RX supplement- will cont monitor


Code(s): E87.6 - HYPOKALEMIA





(12) Hypomagnesemia


Assessment/Plan: 


2.3 TODAY post Mg supplement- resolved


Code(s): E83.42 - HYPOMAGNESEMIA





(13) Venous thromboembolism (VTE) prophylaxis provided within 24 hours of 

arrival


Assessment/Plan: 


cont c Hep sq since admission; Surgeon Rx Lovenox 40 mg sq daily


Code(s): TXO1834 -

## 2017-01-17 NOTE — PN
Progress Note (short form)





- Note


Progress Note: 


Had one episode of vomiting overnight


None since


+ BM x 2


Pain controlled


AVSS


Abd soft, wound repacked, fascia clean and intact


AMARI x 2 serosanguinous


WBC 9.6


H/H stable





OOB


Clears


NGT in place- check residuals








Problem List





- Problems


(1) Fever


Code(s): R50.9 - FEVER, UNSPECIFIED   Qualifiers: 


     Fever type: other        Qualified Code(s): R50.81 - Fever presenting with 

conditions classified elsewhere  





(2) Intestinal obstruction


Code(s): K56.60 - UNSPECIFIED INTESTINAL OBSTRUCTION   Qualifiers: 


     Intestinal obstruction type: obstruction due to adhesions        Qualified 

Code(s): K56.5 - Intestinal adhesions [bands] with obstruction (postprocedural) 

(postinfection)  





(3) Small bowel obstruction due to adhesions


Code(s): K56.5 - INTESTINAL ADHESIONS W OBST (POSTPROCEDURAL) (POSTINFECTION)

## 2017-01-17 NOTE — PATH
Surgical Pathology Report



Patient Name:  SUMAYA THOMAS

Accession #:  

Med. Rec. #:  [id number]                                                        

   /Age/Gender:  1965 (Age: 51) / M

Account:  [id number]                                                          

             Location: [address] [place] [address]/[facility]

Taken:  2017

Received:  2017

Reported:  2017

Physicians:  ROBERTO Ferrara

  



Specimen(s) Received

A: SMALL BOWEL 

B: SMALL BOWEL, CECUM AND APPENDIX 

C: ADHESIONS 





Clinical History

High-grade bowel obstruction







Final Diagnosis

A. SMALL BOWEL, RESECTION:

JEJUNUM WITH JEJUNAL DIVERTICULUM WITH GANGRENOUS DIVERTICULITIS WITH TRANSMURAL

NECROTIZING INFLAMMATION WITH EVIDENCE OF PERFORATION AND EXTENSIVE

FIBROVASCULAR ADHESIONS WITH ACUTE INFLAMMATION. 

ADHESED SMALL BOWEL WITH MARKED ACUTE SEROSITIS AND SMALL FOCI OF MILD

NONSPECIFIC MUCOSAL ACTIVE INFLAMMATION.

NO MALIGNANCY IDENTIFIED.

SURGICAL RESECTION MARGINS APPEAR VIABLE.



B. SMALL BOWEL, CECUM AND APPENDIX, RESECTION:

ILEUM WITH EXTENSIVE ADHESIONS WITH ACUTE NECROTIZING INFLAMMATION AND

ASSOCIATED MARKED SEROSITIS, FOCAL MILD NONSPECIFIC MUCOSAL ACTIVE INFLAMMATION.

CECUM WITH SEROSAL HEMORRHAGE AND VASCULAR CONGESTION.

APPENDIX WITHOUT SIGNIFICANT PATHOLOGIC CHANGES.

NO MALIGNANCY IDENTIFIED.

SURGICAL RESECTION MARGINS APPEAR VIABLE.



C. ADHESIONS, EXCISION:

BENIGN FIBROVASCULAR AND FIBROFATTY TISSUE WITH MARKED ACUTE NECROTIZING

INFLAMMATION AND MARKED VASCULAR CONGESTION.





***Electronically Signed***

Alexander Finkelstein, M.D.





Gross Description

A. Received in formalin, labeled "small bowel" is an 8 cm in length portion of

small bowel with 2 stapled mucosal margins and minimal attached fat. The outer

surface is tan grey, focally hemorrhagic and displays focal attached exudate.

There is a 9 cm in length diverticulum-like structure extending from the central

portion of the specimen, and the lumens are contiguous. The outer surface of the

diverticulum-like structure is thin walled and necrotic with focal hemorrhage

and exudate. The blunt end of the diverticulum-like structure is adhesed to a 19

cm in length dilated portion of small bowel with 2 stapled mucosal margins. The

outer surface of the dilated portion of bowel is tan-gray with focal hemorrhage

and exudate and thick fibrous adhesions. The mucosa of both portions of small

bowel is tan-green with normal folds. No mucosal masses are identified. The

mucosa of the diverticulum-like structure is necrotic with flattened folds.

Representative sections are submitted in 13 cassettes as follows:

1-7-vvghyoblsadc stapled mucosal margins from the shorter length of small bowel;

3-4-representative mucosa from shorter length of small bowel; 5-attachment site

of shorter length of small bowel to the diverticulum ; 6- diverticulum; 7-blunt

end of the diverticulum with fibrous adhesions; 3-8-pprsnjynvjzi stapled mucosal

margins from longer, dilated portion of small bowel; 10-representative mucosa

from longer, dilated portion of small bowel with serosal exudate; 11-additional

representative mucosa from longer, dilated portion of small bowel;

83-89-bzewkgbbaxpich mucosa from longer, dilated portion of small bowel with

attached fibrous adhesions.



B. Received in formalin, labeled "small bowel, cecum, appendix" is a 30 cm in

length portion of small bowel with an attached 6 cm in length portion of cecum.

The specimen displays 2 stapled mucosal margins as well as moderate attached,

focally hemorrhagic fat. There is a 6 cm in length vermiform appendix attached

to the cecum with a stapled distal tip. The serosa is tan-gray with focal

adhesions and exudate. The small bowel mucosa is tan-green with normal folds.

The cecal mucosa is tan-green with focally flattened folds. No mucosal masses

are identified. Representative sections are submitted in 10 cassettes will

follows: 1-proximal mucosal margin of resection; 2-distal mucosal margin of

resection; 3-vermiform appendix; 4-7-small bowel; 8-cecum with underlying

hemorrhagic fat; 9-10-additional cecal mucosa.



C. Received in formalin, labeled "adhesions" is a 7.0 x 0.6 x 0.6 cm tan brown,

irregular portion of fibrous tissue and fat with attached exudate. The specimen

is sectioned and entirely submitted in 3 cassettes.





[facility]

## 2017-01-17 NOTE — SURG
Surgery First Assist Note


First Assist: Susan Nam PA-C


Date of Service: 01/14/17


Diagnosis: 


 High grade small bowel obstruction





Procedure: 





 Diagnostic laparoscopy, exploratory laparotomy, extensive lysis of adhesions, 

small bowel resection with anastomosis, ileocolic resection with anastomosis, 

component seperation


I was present for the entirety of the operative procedure. For further detail, 

please refer to operative report.








Visit type





- Case Type


Case Type: ED Admission





- Emergency


Emergency Visit: Yes


ED Registration Date: 01/11/17


Care time: The patient presented to the Emergency Department on the above date 

and was hospitalized for further evaluation of their emergent condition.





- New patient


This patient is new to me today: Yes


Date on this admission: 01/17/17





- Critical Care


Critical Care patient: No

## 2017-01-18 LAB
ANION GAP SERPL CALC-SCNC: 7 MMOL/L (ref 8–16)
BASOPHILS # BLD: 0.8 % (ref 0–2)
CALCIUM SERPL-MCNC: 7.8 MG/DL (ref 8.5–10.1)
CO2 SERPL-SCNC: 26 MMOL/L (ref 21–32)
CREAT SERPL-MCNC: 0.6 MG/DL (ref 0.7–1.3)
DEPRECATED RDW RBC AUTO: 13.6 % (ref 11.9–15.9)
EOSINOPHIL # BLD: 4.1 % (ref 0–4.5)
GLUCOSE SERPL-MCNC: 113 MG/DL (ref 74–106)
MAGNESIUM SERPL-MCNC: 2.1 MG/DL (ref 1.8–2.4)
MCH RBC QN AUTO: 32.5 PG (ref 25.7–33.7)
MCHC RBC AUTO-ENTMCNC: 33.6 G/DL (ref 32–35.9)
MCV RBC: 96.8 FL (ref 80–96)
NEUTROPHILS # BLD: 74.7 % (ref 42.8–82.8)
PLATELET # BLD AUTO: 328 K/MM3 (ref 134–434)
PMV BLD: 8.1 FL (ref 7.5–11.1)
WBC # BLD AUTO: 9.7 K/MM3 (ref 4–10)

## 2017-01-18 RX ADMIN — DEXTROSE AND SODIUM CHLORIDE SCH: 5; 450 INJECTION, SOLUTION INTRAVENOUS at 22:33

## 2017-01-18 RX ADMIN — METRONIDAZOLE SCH MLS/HR: 500 INJECTION, SOLUTION INTRAVENOUS at 13:49

## 2017-01-18 RX ADMIN — KETOROLAC TROMETHAMINE SCH MG: 30 INJECTION INTRAMUSCULAR; INTRAVENOUS at 15:30

## 2017-01-18 RX ADMIN — KETOROLAC TROMETHAMINE SCH MG: 30 INJECTION INTRAMUSCULAR; INTRAVENOUS at 22:34

## 2017-01-18 RX ADMIN — PANTOPRAZOLE SODIUM SCH MLS/HR: 40 INJECTION, POWDER, FOR SOLUTION INTRAVENOUS at 11:38

## 2017-01-18 RX ADMIN — KETOROLAC TROMETHAMINE SCH MG: 30 INJECTION INTRAMUSCULAR; INTRAVENOUS at 09:37

## 2017-01-18 RX ADMIN — ENOXAPARIN SODIUM SCH MG: 40 INJECTION SUBCUTANEOUS at 09:40

## 2017-01-18 RX ADMIN — LEVOFLOXACIN SCH MLS/HR: 5 INJECTION, SOLUTION INTRAVENOUS at 16:58

## 2017-01-18 RX ADMIN — METRONIDAZOLE SCH MLS/HR: 500 INJECTION, SOLUTION INTRAVENOUS at 19:34

## 2017-01-18 RX ADMIN — DEXTROSE AND SODIUM CHLORIDE SCH: 5; 450 INJECTION, SOLUTION INTRAVENOUS at 22:42

## 2017-01-18 RX ADMIN — HYDROMORPHONE HYDROCHLORIDE PRN MG: 2 INJECTION, SOLUTION INTRAMUSCULAR; INTRAVENOUS; SUBCUTANEOUS at 18:24

## 2017-01-18 RX ADMIN — KETOROLAC TROMETHAMINE SCH MG: 30 INJECTION INTRAMUSCULAR; INTRAVENOUS at 03:27

## 2017-01-18 RX ADMIN — DEXTROSE AND SODIUM CHLORIDE SCH MLS/HR: 5; 450 INJECTION, SOLUTION INTRAVENOUS at 03:26

## 2017-01-18 NOTE — PN
Progress Note, Physician


Chief Complaint: 


pain much better 3 now w nee for Dilaudid sky once erlir (6/10); NGT 

discontinued ,no NV; BMx 3 w  lquidy w some solid pieces w a llittle  Flatus.


History of Present Illness: 


POD#4 1-  Post Exp Lap on PCA, see above





- Current Medication List


Current Medications: 


Active Medications





Enoxaparin Sodium (Lovenox -)  40 mg SQ DAILY [facility]


   Last Admin: 01/18/17 09:40 Dose:  40 mg


Hydromorphone HCl (Dilaudid Injection -)  2 mg IVPB Q4H PRN


   PRN Reason: PAIN


   Last Admin: 01/18/17 18:24 Dose:  2 mg


Dextrose/Sodium Chloride (D5-1/2ns -)  1,000 mls @ 83 mls/hr IV ASDIR [facility]


   Last Admin: 01/18/17 03:26 Dose:  83 mls/hr


Metronidazole (Flagyl 500mg Premixed Ivpb -)  100 mls @ 100 mls/hr IVPB Q8H-IV 

[facility]


   Last Admin: 01/18/17 19:34 Dose:  100 mls/hr


Levofloxacin (Levaquin 500 Mg Premixed Ivpb -)  100 mls @ 100 mls/hr IVPB DAILY@

0800 [facility]


   Last Admin: 01/18/17 16:58 Dose:  100 mls/hr


Ketorolac Tromethamine (Toradol Injection -)  30 mg IVPB Q6H [facility]


   Stop: 01/21/17 14:59


   Last Admin: 01/18/17 15:30 Dose:  30 mg











- Objective


Vital Signs: 


 Vital Signs











Temperature  99.1 F   01/18/17 15:32


 


Pulse Rate  64   01/18/17 15:32


 


Respiratory Rate  18   01/18/17 15:32


 


Blood Pressure  132/82   01/18/17 15:32


 


O2 Sat by Pulse Oximetry (%)  97   01/18/17 09:00











Constitutional: Yes: No Distress, Calm


Neck: Yes: Supple


Cardiovascular: Yes: Regular Rate and Rhythm


Respiratory: Yes: CTA Bilaterally


Gastrointestinal: Yes: Normal Bowel Sounds, Soft, Other (no tend or gding)


...Rectal Exam: Yes: Deferred


Musculoskeletal: Yes: WNL


Extremities: Yes: Calf Tenderness (none)


Edema: No


Peripheral Pulses: Left Doralis Pedis: 2+, Right Dorsalis Pedis: 2+


Integumentary: Yes: WNL


Wound/Incision: Yes: Dressing Dry and Intact


Neurological: Yes: WNL, Alert, Oriented


...Motor Strength: WNL


Psychiatric: Yes: WNL, Alert, Oriented


Labs: 


 CBC, BMP





 01/18/17 06:35 





 01/18/17 06:35 





 INR, PTT











INR  1.79  (0.82-1.09)  H  01/11/17  20:15    








BCSx2 2nd set NEG x92 hrs





Problem List





- Problems


(1) Testicle cancer


Assessment/Plan: 


Testis CA 2010 w Node Resction in Abd and Retroperitoneal areas with NL eval 

2016 


Code(s): C62.90 - MALIG NEOPLASM OF UNSP TESTIS, UNSP DESCENDED OR UNDESCENDED 

  





(2) Synechia, posterior


Assessment/Plan: 


post Above mentioned sx 2010; + extensive seen intraop.


Code(s): H21.549 - POSTERIOR SYNECHIAE (IRIS), UNSPECIFIED EYE   





(3) Ankylosing spondylitis


Assessment/Plan: 


chronic on meds pre admission- stable.


Code(s): M45.9 - ANKYLOSING SPONDYLITIS OF UNSPECIFIED SITES IN SPINE





(4) Small bowel obstruction due to adhesions


Assessment/Plan: 


POD #4 (1-13-17)Explo Lap w SB Resct w anastomosis Plus Ileocolic Resct w 

amastomosis.l; +BM x 3 most liq w some solids per pt; ngt discont today; 

tolerating clear liquid diet w good appetite no nv. pain level w/o pain meds 3/

10 only once needing dilaudid (6/10)


Code(s): K56.5 - INTESTINAL ADHESIONS W OBST (POSTPROCEDURAL) (POSTINFECTION)





(5) Fever


Assessment/Plan: 


SEE ABOVE NOTES. POD#3 - 99.3 w/o clinical sxs now. to cont on iV zosyn and 

flagyl. per Surgeon ( pt did have intraop necrotic diverticula -see Surgeons 

notes) #2 BCS 1-14-17 NEG x 92 h.


Code(s): R50.9 - FEVER, UNSPECIFIED   Qualifiers: 


     Fever type: other        Qualified Code(s): R50.81 - Fever presenting with 

conditions classified elsewhere  





(6) Status post exploratory laparotomy


Assessment/Plan: 


POD #3 (1-)due to SBO due to Adhesions from Testis Ca W Abd cav and 

retropeit Adenopathy POST Left orchiectomy and node resections 2010. Clinically 

stable in MedSx floor. See above notes


Code(s): Z98.89 - OTHER SPECIFIED POSTPROCEDURAL STATES * DO NOT USE *





(7) Pain management


Assessment/Plan: 


pt responding well, able to ambulate and pain IS controlled (3/10) w/o meds.


Code(s): R52 - PAIN, UNSPECIFIED





(8) Discharge planning issues


Assessment/Plan: 


Pt clinically better; on IV abx; Defer Final DC Home clearance to Surgeon.


Code(s): Z02.9 - ENCOUNTER FOR ADMINISTRATIVE EXAMINATIONS, UNSPECIFIED





(9) Postoperative anemia due to chronic blood loss


Assessment/Plan: 


post Sx 1-13-17- Hgb trending down- will cont to monitor ; anticipate will 

stabilize in next day or so. If not then will disc w Surgeon and Transfuse 1 ut 

IF hgb <9 TODAY Hgb stable at 10- will cont to monitor


Code(s): D50.0 - IRON DEFICIENCY ANEMIA SECONDARY TO BLOOD LOSS (CHRONIC)





(10) Hypernatremia


Assessment/Plan: 


Na 147 today on D50.45 NSS @83ml/hr . Pt has been advanced to Full liq diet w 

good tolerance. Will red rate to 50 cc/hr and cont to monitor


Code(s): E87.0 - HYPEROSMOLALITY AND HYPERNATREMIA





(11) Hypokalemia


Code(s): E87.6 - HYPOKALEMIA





(12) Hypomagnesemia


Assessment/Plan: 


 resolved


Code(s): E83.42 - HYPOMAGNESEMIA





(13) Venous thromboembolism (VTE) prophylaxis provided within 24 hours of 

arrival


Assessment/Plan: 


cont c Hep sq since admission; Surgeon Rx Lovenox 40 mg sq daily


Code(s): UKT7587 - 





(14) Anemia


Assessment/Plan: 


post op Hgb has remained stable @ 10.t


Code(s): D64.9 - ANEMIA, UNSPECIFIED

## 2017-01-18 NOTE — PN
[id number] he has some nausea, that he states feels more like a gag reflex 

from his NG tube.  He denies vomiting since yesterday at 5AM, other than a 

gagging sensation.  He had clear liquids around 6PM yesterday.  Around midnight 

he reclined in bed and felt as though some liquid was going to come up.  The NG 

tube was placed back to suction for a short time.  He has had some water since 

then.  He states he had a third liquid BM last night with some solid material.  

He denies flatus.  He states his pain is controlled. 





 Last Vital Signs











Temp Pulse Resp BP Pulse Ox


 


 97.6 F   63   20   136/76   99 


 


 01/18/17 06:00  01/18/17 06:00  01/18/17 06:00  01/18/17 06:00  01/17/17 21:00








 CBC, BMP





 01/18/17 06:35 


Potassium 3.3 yesterday, follow-up pending lab results today





I&O


Recording 24 hours 1/17- 


Left AMARI- 120 ml


Right AMARI- 5 ml


NG- 100 ml





PE:


Gen: NAD, resting comfortably in bed


ENT: NG tube in place not placed to suction


ABD: Midline incision with staples intact, minimal drainage, no erythema, 

packing replaced and dressing changed on rounds, left and right AMARI drains in 

place with serosanguineous drainage, mild tenderness with palp, soft, 

nondistended 





<Frida Cox - Last Filed: 01/18/17 08:38>





- Note


Progress Note: 


Agree


+ Multiple BMs


Low residuals from NG tube


Tolerated clears with NG tube in place


AVSS


Abd soft, packing changed


AMARI x 2 serous


WBC 9.7





NG tube removed


Left AMARI removed fully intact





Antibiotics reordered as path showed necrosis and perforation


The family also wanted to continue antibiotics





Full liquid for dinner


OOB





VNS ordered fro wound packing at home





<Randy Perez - Last Filed: 01/18/17 12:40>





Problem List





- Problems


(1) Status post exploratory laparotomy


Assessment/Plan: 


POD#5 s/p ex lap, ARI, small bowel resection, ileocolic resection with 

anastomosis x2, component separation





Continue clear liquid diet


Continue NG tube for now, check residuals


OOB and ambulate


Dressings changed on rounds


Monitor flatus and bowel function


Monitor AMARI output


Pain control


Followup labs and replete K





Code(s): Z98.89 - OTHER SPECIFIED POSTPROCEDURAL STATES * DO NOT USE *








<Frida Cox - Last Filed: 01/18/17 08:38>





- Problems


(1) Fever


Code(s): R50.9 - FEVER, UNSPECIFIED   Qualifiers: 


     Fever type: other        Qualified Code(s): R50.81 - Fever presenting with 

conditions classified elsewhere  





(2) Intestinal obstruction


Code(s): K56.60 - UNSPECIFIED INTESTINAL OBSTRUCTION   Qualifiers: 


     Intestinal obstruction type: obstruction due to adhesions        Qualified 

Code(s): K56.5 - Intestinal adhesions [bands] with obstruction (postprocedural) 

(postinfection)  





(3) Small bowel obstruction due to adhesions


Code(s): K56.5 - INTESTINAL ADHESIONS W OBST (POSTPROCEDURAL) (POSTINFECTION)








<Randy Perez - Last Filed: 01/18/17 12:40>

## 2017-01-19 RX ADMIN — KETOROLAC TROMETHAMINE SCH MG: 30 INJECTION INTRAMUSCULAR; INTRAVENOUS at 11:22

## 2017-01-19 RX ADMIN — DEXTROSE AND SODIUM CHLORIDE SCH MLS/HR: 5; 450 INJECTION, SOLUTION INTRAVENOUS at 03:01

## 2017-01-19 RX ADMIN — METRONIDAZOLE SCH MLS/HR: 500 INJECTION, SOLUTION INTRAVENOUS at 03:01

## 2017-01-19 RX ADMIN — ENOXAPARIN SODIUM SCH MG: 40 INJECTION SUBCUTANEOUS at 09:14

## 2017-01-19 RX ADMIN — KETOROLAC TROMETHAMINE SCH MG: 30 INJECTION INTRAMUSCULAR; INTRAVENOUS at 05:31

## 2017-01-19 RX ADMIN — METRONIDAZOLE SCH MLS/HR: 500 INJECTION, SOLUTION INTRAVENOUS at 18:09

## 2017-01-19 RX ADMIN — DEXTROSE AND SODIUM CHLORIDE SCH: 5; 450 INJECTION, SOLUTION INTRAVENOUS at 23:14

## 2017-01-19 RX ADMIN — KETOROLAC TROMETHAMINE SCH MG: 30 INJECTION INTRAMUSCULAR; INTRAVENOUS at 16:59

## 2017-01-19 RX ADMIN — LEVOFLOXACIN SCH MLS/HR: 5 INJECTION, SOLUTION INTRAVENOUS at 09:12

## 2017-01-19 RX ADMIN — KETOROLAC TROMETHAMINE SCH: 30 INJECTION INTRAMUSCULAR; INTRAVENOUS at 23:14

## 2017-01-19 RX ADMIN — MULTIVITAMIN TABLET SCH TAB: TABLET at 09:14

## 2017-01-19 RX ADMIN — METRONIDAZOLE SCH MLS/HR: 500 INJECTION, SOLUTION INTRAVENOUS at 10:29

## 2017-01-19 NOTE — PN
Progress Note (short form)





- Note


Progress Note: 


No acute events


Pain controlled


No vomiting


+BM


AVSS


Abd soft, packing changed- wound clean, repacked


Second AMARI removed fully intact





Soft diet


Antibiotics


OOB


If tolerates soft diet can plan discharge tomorrow on antibiotics





Problem List





- Problems


(1) Fever


Code(s): R50.9 - FEVER, UNSPECIFIED   Qualifiers: 


     Fever type: other        Qualified Code(s): R50.81 - Fever presenting with 

conditions classified elsewhere  





(2) Intestinal obstruction


Code(s): K56.60 - UNSPECIFIED INTESTINAL OBSTRUCTION   Qualifiers: 


     Intestinal obstruction type: obstruction due to adhesions        Qualified 

Code(s): K56.5 - Intestinal adhesions [bands] with obstruction (postprocedural) 

(postinfection)  





(3) Small bowel obstruction due to adhesions


Code(s): K56.5 - INTESTINAL ADHESIONS W OBST (POSTPROCEDURAL) (POSTINFECTION)

## 2017-01-19 NOTE — PN
Progress Note, Physician


Chief Complaint: 


nO PAIN +TOLERATED Lasagna today (didnot eat cheese) +bm no NVD; spoke to 

Surgeon


History of Present Illness: 


POD#5 1-  Post Exp Lap on PCA, see above





- Current Medication List


Current Medications: 


Active Medications





Enoxaparin Sodium (Lovenox -)  40 mg SQ DAILY [facility]


   Last Admin: 01/19/17 09:14 Dose:  40 mg


Metronidazole (Flagyl 500mg Premixed Ivpb -)  100 mls @ 100 mls/hr IVPB Q8H-IV 

[facility]


   Last Admin: 01/19/17 18:09 Dose:  100 mls/hr


Levofloxacin (Levaquin 500 Mg Premixed Ivpb -)  100 mls @ 100 mls/hr IVPB DAILY@

0800 [facility]


   Last Admin: 01/19/17 09:12 Dose:  100 mls/hr


Dextrose/Sodium Chloride (D5-1/2ns -)  1,000 mls @ 50 mls/hr IV ASDIR [facility]


   Last Admin: 01/19/17 03:01 Dose:  50 mls/hr


Ketorolac Tromethamine (Toradol Injection -)  30 mg IVPB Q6H [facility]


   Stop: 01/21/17 14:59


   Last Admin: 01/19/17 16:59 Dose:  30 mg


Multivitamins/Minerals/Vitamin C (Tab-A-Vit -)  1 tab PO DAILY [facility]


   Last Admin: 01/19/17 09:14 Dose:  1 tab











- Objective


Vital Signs: 


 Vital Signs











Temperature  98.3 F   01/19/17 18:30


 


Pulse Rate  69   01/19/17 18:30


 


Respiratory Rate  20   01/19/17 18:30


 


Blood Pressure  120/68   01/19/17 18:30


 


O2 Sat by Pulse Oximetry (%)  98   01/19/17 09:00











Constitutional: Yes: No Distress, Calm


Neck: Yes: Supple


Cardiovascular: Yes: Regular Rate and Rhythm, Murmur (none)


Respiratory: Yes: CTA Bilaterally


Gastrointestinal: Yes: Normal Bowel Sounds, Soft, Tenderness (NONE)


...Rectal Exam: Yes: Deferred


Musculoskeletal: Yes: WNL


Extremities: Yes: WNL, Calf Tenderness (no cxalf pain)


Edema: No


Peripheral Pulses: Left Doralis Pedis: 2+, Right Dorsalis Pedis: 2+


Integumentary: Yes: WNL


Wound/Incision: Yes: Dressing Dry and Intact


Neurological: Yes: Alert, Oriented, Other (non focal)


...Motor Strength: WNL


Psychiatric: Yes: Alert, Oriented


Labs: 


 CBC, BMP





 01/18/17 06:35 





 01/18/17 06:35 





 INR, PTT











INR  1.79  (0.82-1.09)  H  01/11/17  20:15    














Problem List





- Problems


(1) Testicle cancer


Assessment/Plan: 


Testis CA 2010 w Node Resction in Abd and Retroperitoneal areas with NL eval 

2016 


Code(s): C62.90 - MALIG NEOPLASM OF UNSP TESTIS, UNSP DESCENDED OR UNDESCENDED 

  





(2) Synechia, posterior


Assessment/Plan: 


post Above mentioned sx 2010; + extensive seen intraop.


Code(s): H21.549 - POSTERIOR SYNECHIAE (IRIS), UNSPECIFIED EYE   





(3) Ankylosing spondylitis


Assessment/Plan: 


chronic on meds pre admission- stable.


Code(s): M45.9 - ANKYLOSING SPONDYLITIS OF UNSPECIFIED SITES IN SPINE





(4) Small bowel obstruction due to adhesions


Assessment/Plan: 


POD #5 (1-13-17)Explo Lap w SB Resct w anastomosis Plus Ileocolic Resct w 

amastomosis.l; +BM ; tolerating lasagna (discarded cheese) today w good 

appetite no nv.  No pain= ambulating w abd girdle/brace bc "feels more secure"


Code(s): K56.5 - INTESTINAL ADHESIONS W OBST (POSTPROCEDURAL) (POSTINFECTION)





(5) Fever


Code(s): R50.9 - FEVER, UNSPECIFIED   Qualifiers: 


     Fever type: other        Qualified Code(s): R50.81 - Fever presenting with 

conditions classified elsewhere  





(6) Status post exploratory laparotomy


Assessment/Plan: 


POD #4 (1-)due to SBO due to Adhesions from Testis Ca W Abd cav and 

retropeit Adenopathy POST Left orchiectomy and node resections 2010. Clinically 

stable in MedSx floor. See above notes. Cleared by Surgeon to dc tomorrow w po 

abx and po pain meds prn if cont to tolertae diet.


Code(s): Z98.89 - OTHER SPECIFIED POSTPROCEDURAL STATES * DO NOT USE *





(7) Pain management


Assessment/Plan: 


No Pain; using abd brace when ambulating for support. Surgeon Rx po Prn pain 

meds for dc home


Code(s): R52 - PAIN, UNSPECIFIED





(8) Discharge planning issues


Assessment/Plan: 


Pt clinically better; clared to dc home tomorrow if cont to tolerate diet. SEE 

above notes:  to arrange for VNS for daily wd care as per Surgeons 

recommendations and approval from health insurance.- disc w pt; verb underts 

and agrred w plan.


Code(s): Z02.9 - ENCOUNTER FOR ADMINISTRATIVE EXAMINATIONS, UNSPECIFIED





(9) Postoperative anemia due to chronic blood loss


Assessment/Plan: 


post Sx 1-13-17- Hgb was trending down- has remained stabl @ 10 in last 3 days. 

anticipate will stabilize to pre op levels in near future . Rec fup w Primary 

MD in fup appt.


Code(s): D50.0 - IRON DEFICIENCY ANEMIA SECONDARY TO BLOOD LOSS (CHRONIC)





(10) Hypernatremia


Assessment/Plan: 


post GI Surgery psot Sev days of IVF. Normoglycemia and clinically well.


Code(s): E87.0 - HYPEROSMOLALITY AND HYPERNATREMIA





(11) Hypokalemia


Assessment/Plan: 


stable post supllement


Code(s): E87.6 - HYPOKALEMIA





(12) Hypomagnesemia


Assessment/Plan: 


 resolved


Code(s): E83.42 - HYPOMAGNESEMIA





(13) Venous thromboembolism (VTE) prophylaxis provided within 24 hours of 

arrival


Assessment/Plan: 


cont c Hep sq since admission; Surgeon Rx Lovenox 40 mg sq daily. will be dc 

upon dc home.


Code(s): XSH1302 -

## 2017-01-20 VITALS — TEMPERATURE: 98.7 F | HEART RATE: 74 BPM | DIASTOLIC BLOOD PRESSURE: 61 MMHG | SYSTOLIC BLOOD PRESSURE: 121 MMHG

## 2017-01-20 RX ADMIN — KETOROLAC TROMETHAMINE SCH MG: 30 INJECTION INTRAMUSCULAR; INTRAVENOUS at 02:22

## 2017-01-20 RX ADMIN — KETOROLAC TROMETHAMINE SCH MG: 30 INJECTION INTRAMUSCULAR; INTRAVENOUS at 14:39

## 2017-01-20 RX ADMIN — METRONIDAZOLE SCH MLS/HR: 500 INJECTION, SOLUTION INTRAVENOUS at 11:06

## 2017-01-20 RX ADMIN — KETOROLAC TROMETHAMINE SCH MG: 30 INJECTION INTRAMUSCULAR; INTRAVENOUS at 09:47

## 2017-01-20 RX ADMIN — METRONIDAZOLE SCH MLS/HR: 500 INJECTION, SOLUTION INTRAVENOUS at 17:03

## 2017-01-20 RX ADMIN — METRONIDAZOLE SCH MLS/HR: 500 INJECTION, SOLUTION INTRAVENOUS at 03:33

## 2017-01-20 RX ADMIN — ENOXAPARIN SODIUM SCH MG: 40 INJECTION SUBCUTANEOUS at 09:53

## 2017-01-20 RX ADMIN — MULTIVITAMIN TABLET SCH TAB: TABLET at 09:53

## 2017-01-20 RX ADMIN — LEVOFLOXACIN SCH MLS/HR: 5 INJECTION, SOLUTION INTRAVENOUS at 08:25

## 2017-01-20 RX ADMIN — DEXTROSE AND SODIUM CHLORIDE SCH MLS/HR: 5; 450 INJECTION, SOLUTION INTRAVENOUS at 08:25

## 2017-01-20 NOTE — PN
Progress Note (short form)





- Note


Progress Note: 


No acute events


Tolerated regular diet


AVSS


Abd soft, packing changed, wound clean- repacked


Doing well


VNS in place for wound packing


Can discharge home


Antibiotics and pain medication ordered


Follow up 0n 1/30/17 in the [place] office





Problem List





- Problems


(1) Fever


Code(s): R50.9 - FEVER, UNSPECIFIED   Qualifiers: 


     Fever type: other        Qualified Code(s): R50.81 - Fever presenting with 

conditions classified elsewhere  





(2) Intestinal obstruction


Code(s): K56.60 - UNSPECIFIED INTESTINAL OBSTRUCTION   Qualifiers: 


     Intestinal obstruction type: obstruction due to adhesions        Qualified 

Code(s): K56.5 - Intestinal adhesions [bands] with obstruction (postprocedural) 

(postinfection)  





(3) Small bowel obstruction due to adhesions


Code(s): K56.5 - INTESTINAL ADHESIONS W OBST (POSTPROCEDURAL) (POSTINFECTION)

## 2017-03-14 ENCOUNTER — HOSPITAL ENCOUNTER (INPATIENT)
Dept: HOSPITAL 74 - FER | Age: 52
LOS: 2 days | Discharge: HOME | DRG: 390 | End: 2017-03-16
Attending: NURSE PRACTITIONER | Admitting: INTERNAL MEDICINE
Payer: COMMERCIAL

## 2017-03-14 VITALS — BODY MASS INDEX: 21.7 KG/M2

## 2017-03-14 DIAGNOSIS — K56.5: Primary | ICD-10-CM

## 2017-03-14 DIAGNOSIS — Z85.47: ICD-10-CM

## 2017-03-14 LAB
ALBUMIN SERPL-MCNC: 4.5 G/DL (ref 3.5–5)
ALP SERPL-CCNC: 64 U/L (ref 32–92)
ALT SERPL-CCNC: 24 U/L (ref 10–40)
ANION GAP SERPL CALC-SCNC: 9 MMOL/L (ref 8–16)
AST SERPL-CCNC: 27 U/L (ref 10–42)
BASOPHILS # BLD: 0.3 % (ref 0–2)
BILIRUB SERPL-MCNC: 1.7 MG/DL (ref 0.2–1)
BILIRUB UR STRIP.AUTO-MCNC: (no result) MG/DL
CALCIUM SERPL-MCNC: 9 MG/DL (ref 8.4–10.2)
CO2 SERPL-SCNC: 24 MMOL/L (ref 22–28)
COLOR UR: YELLOW
CREAT SERPL-MCNC: 0.8 MG/DL (ref 0.6–1.3)
DEPRECATED RDW RBC AUTO: 12.6 % (ref 11.9–15.9)
EOSINOPHIL # BLD: 0.1 % (ref 0–4.5)
GLUCOSE SERPL-MCNC: 114 MG/DL (ref 74–106)
INR BLD: 1.16 (ref 0.82–1.09)
KETONES UR QL STRIP: (no result)
MCH RBC QN AUTO: 32.1 PG (ref 25.7–33.7)
MCHC RBC AUTO-ENTMCNC: 33.7 G/DL (ref 32–35.9)
MCV RBC: 95.2 FL (ref 80–96)
NEUTROPHILS # BLD: 88 % (ref 42.8–82.8)
PH UR: 7 [PH] (ref 4.5–8)
PLATELET # BLD AUTO: 326 K/MM3 (ref 134–434)
PMV BLD: 7.6 FL (ref 7.5–11.1)
PROT SERPL-MCNC: 6.9 G/DL (ref 6.4–8.3)
PT PNL PPP: 12.9 SEC (ref 10.2–13)
SP GR UR: 1.01 (ref 1–1.02)
UROBILINOGEN UR STRIP-MCNC: (no result) MG/DL (ref 0.2–1)
WBC # BLD AUTO: 11.6 K/MM3 (ref 4–10)

## 2017-03-14 RX ADMIN — SODIUM CHLORIDE SCH MLS/HR: 9 INJECTION, SOLUTION INTRAVENOUS at 14:15

## 2017-03-14 RX ADMIN — HEPARIN SODIUM SCH UNIT: 5000 INJECTION, SOLUTION INTRAVENOUS; SUBCUTANEOUS at 21:30

## 2017-03-14 NOTE — HP
CHIEF COMPLAINT: abdominal pain, nausea, vomiting





PCP: Juan Contasessa in [place]





HISTORY OF PRESENT ILLNESS:


This is a51 year old male with a past medical history of right testicular 

cancer s/p resection, SBO, adhesions presented to the ED with a 1-2 day history 

of abdominal pain with nausea and vomiting. Reports sensation is similar to 

previous SBO. Pt denies fever, chills. 





ER course was notable for:


(1) SBO on CT scan


(2) NGT placed





Recent Travel:


pt denies


PAST MEDICAL HISTORY:


SBO


testicular CA


PAST SURGICAL HISTORY:


right orchiectomy with retroperitoneal lymph node dissection


SBO s/p resection with anastomosis, extensive lysis of adhesions, ileocolic 

resection with anastomosis and a component separation on 1/13/17





Social History:


Smoking: pt denies


Alcohol: occasional


Drugs: pt denies





Family History:


Mother alive age 72, neck arthritis, MI 5 years ago


Father alive age 73, no major medical problems





Allergies


No Known Allergies Allergy (Verified 03/14/17 12:01)


 


HOME MEDICATIONS:


 3





 Medication  Instructions  Recorded


 


Meloxicam [Mobic] 15 mg PO DAILY 01/11/17


 


Sulfasalazine 1,000 mg PO DAILY 01/11/17








REVIEW OF SYSTEMS


CONSTITUTIONAL: 


Absent:  fever, chills, diaphoresis, generalized weakness, malaise, loss of 

appetite, weight change


HEENT: 


Absent:  rhinorrhea, nasal congestion, throat pain, throat swelling, difficulty 

swallowing, mouth swelling, ear pain, eye pain, visual changes


CARDIOVASCULAR: 


Absent: chest pain, syncope, palpitations, irregular heart rate, lightheadedness

, peripheral edema


RESPIRATORY: 


Absent: cough, shortness of breath, dyspnea with exertion, orthopnea, wheezing, 

stridor, hemoptysis


GASTROINTESTINAL: abdominal pain, nausea, vomiting


Absent: abdominal distension, diarrhea, constipation, melena, hematochezia


GENITOURINARY: 


Absent: dysuria, frequency, urgency, hesitancy, hematuria, flank pain, genital 

pain


MUSCULOSKELETAL: 


Absent: myalgia, arthralgia, joint swelling, back pain, neck pain


SKIN: 


Absent: rash, itching, pallor


HEMATOLOGIC/IMMUNOLOGIC: 


Absent: easy bleeding, easy bruising, lymphadenopathy, frequent infections


ENDOCRINE:


Absent: unexplained weight gain, unexplained weight loss, heat intolerance, 

cold intolerance


NEUROLOGIC: 


Absent: headache, focal weakness or paresthesias, dizziness, unsteady gait, 

seizure, mental status changes, bladder or bowel incontinence


PSYCHIATRIC: 


Absent: anxiety, depression, suicidal or homicidal ideation, hallucinations.








PHYSICAL EXAMINATION


Vital Signs - 24 hr





 3





  03/14/17 03/14/17 03/14/17





  12:00 12:47 15:50


 


Temperature 98.4 F  


 


Pulse Rate 109 H  


 


Pulse Rate [   72





Left]   


 


Respiratory 20  16





Rate   


 


Blood Pressure 113/80 113/80 


 


Blood Pressure   112/84





[Right Arm]   


 


O2 Sat by Pulse 95  96





Oximetry (%)   








 3





  03/14/17 03/14/17





  16:28 16:40


 


Temperature 98.4 F 


 


Pulse Rate 67 


 


Pulse Rate [  69





Left]  


 


Respiratory 16 18





Rate  


 


Blood Pressure 128/78 


 


Blood Pressure  118/82





[Right Arm]  


 


O2 Sat by Pulse  100





Oximetry (%)  








GENERAL: Awake, alert, and fully oriented, in no acute distress.


HEAD: Normal with no signs of trauma.


EYES: Pupils equal, round and reactive to light, extraocular movements intact, 

sclera anicteric, conjunctiva clear. No lid lag.


EARS, NOSE, THROAT: Ears normal, nares patent, oropharynx clear without 

exudates. Moist mucous membranes.


NECK: Normal range of motion, supple without lymphadenopathy, JVD, or masses.


LUNGS: Breath sounds equal, clear to auscultation bilaterally. No wheezes, and 

no crackles. No accessory muscle use.


HEART: Regular rate and rhythm, normal S1 and S2 without murmur, rub or gallop.


ABDOMEN: Soft, not distended, normoactive bowel sounds, no guarding, no rebound

, no masses.  No hepatomegaly or  splenomegaly. tender to palpation all 4 

quadrants, cameron RLQ, midline surgical scar intact


MUSCULOSKELETAL: Normal range of motion at all joints. No bony deformities or 

tenderness. No CVA tenderness.


UPPER EXTREMITIES: 2+ pulses, warm, well-perfused. No cyanosis. No clubbing. 

Cap refill <2 seconds. No peripheral edema.


LOWER EXTREMITIES: 2+ pulses, warm, well-perfused. No calf tenderness. No 

peripheral edema. 


NEUROLOGICAL:  Cranial nerves II-XII intact. Normal speech. Normal gait.


PSYCHIATRIC: Cooperative. Good eye contact. Appropriate mood and affect.


SKIN: Warm, dry, normal turgor, no rashes or lesions noted. 


 


Laboratory Results - last 24 hr





 3





  03/14/17 03/14/17 03/14/17 03/14/17





  12:21 12:21 12:21 12:30


 


WBC  11.6 H   


 


RBC  4.47   


 


Hgb  14.3  D   


 


Hct  42.6   


 


MCV  95.2   


 


MCHC  33.7   


 


RDW  12.6   


 


Plt Count  326  D   


 


MPV  7.6   


 


Neutrophils %  88.0 H   


 


Lymphocytes %  6.3 L D   


 


Monocytes %  5.3   


 


Eosinophils %  0.1   


 


Basophils %  0.3   


 


INR   1.16  D  


 


Sodium    138 


 


Potassium    4.1 


 


Chloride    105 


 


Carbon Dioxide    24 


 


Anion Gap    9 


 


BUN    20 H D 


 


Creatinine    0.8 


 


Creat Clearance w eGFR    > 60 


 


Random Glucose    114 H 


 


Lactic Acid     1.182


 


Calcium    9.0 


 


Total Bilirubin    1.7 H D 


 


AST    27 


 


ALT    24  D 


 


Alkaline Phosphatase    64 


 


Total Protein    6.9  D 


 


Albumin    4.5  D 


 


Lipase    30 


 


Urine Color    


 


Urine Appearance    


 


Urine pH    


 


Ur Specific Gravity    


 


Urine Protein    


 


Urine Glucose (UA)    


 


Urine Ketones    


 


Urine Blood    


 


Urine Nitrite    


 


Urine Bilirubin    


 


Urine Urobilinogen    


 


Ur Leukocyte Esterase    


 


Blood Type     O POSITIVE


 


Antibody Screen     Negative








 3





Urine Color  Yellow   03/14/17  15:09    


 


Urine Appearance  Clear   03/14/17  15:09    


 


Urine pH  7.0  (4.5-8)   03/14/17  15:09    


 


Ur Specific Gravity  1.010  (1.005-1.025)   03/14/17  15:09    


 


Urine Protein  Negative  (NEGATIVE)   03/14/17  15:09    


 


Urine Glucose (UA)  Negative  (NEGATIVE)   03/14/17  15:09    


 


Urine Ketones  2+  (NEGATIVE)  H  03/14/17  15:09    


 


Urine Blood  Trace  (NEGATIVE)   03/14/17  15:09    


 


Urine Nitrite  Negative  (NEGATIVE)   03/14/17  15:09    


 


Urine Bilirubin  1+  (NEGATIVE)  H  03/14/17  15:09    


 


Ur Leukocyte Esterase  Negative  (NEGATIVE)   03/14/17  15:09    








CT abd and pelvis:


Sequential axial images were obtained from the domes of the diaphragms through 

the symphysis pubis following the administration of both oral and intravenous 

contrast material. The lung bases are clear. There are moderately distended 

loops of small bowel throughout the abdomen and pelvis consistent with a high-

grade small bowel obstruction. The right colon is mildly distended and fluid-

filled. The left colon is largely collapsed. There are multiple surgical clips 

within the retroperitoneum and pelvis. Clinical correlation as to the nature of 

the procedures is recommended. The possibility of postoperative adhesions as 

the etiology of the obstruction is most likely. The liver, spleen, pancreas, 

adrenal glands and kidneys demonstrate no significant abnormalities. There are 

a few small hepatic and bilateral renal cysts. There is no evidence of intra-

abdominal or retroperitoneal lymphadenopathy or fluid collections. There is no 

evidence of pneumoperitoneum or intra-abdominal abscess. Examination of the 

pelvis demonstrates no evidence of pelvic masses, fluid collections or 

lymphadenopathy. There is no evidence of acute bony abnormalities. 


IMPRESSION: Findings consistent with high-grade partial SBO. Clinical 

correlation and follow-up recommended. Please see above discussion. 





CXR: A nasogastric tube is seen in place with the tip in the expected location 

of the gastric fundus. No discrete infiltrate or pleural effusion is noted. A 

small calcified left upper lobe granuloma is seen. Cardiac size appears within 

normal limits. Atherosclerotic aortic changes are noted. The mediastinum and 

mohan demonstrate no obvious abnormality. Impression: As discussed above. 








ASSESSMENT/PLAN:


51yM with PMH testicular cancer s/p orchiectomy and RPLND, SBO s/p resection 

presented to ED with abdominal pain and nausea and vomiting. He is being 

admitted for SBO.





SBO


   - NGT to [facility]


   - surgical consult


   - NPO


   - morphine for pain


   - zofran for nausea





DVT PPX


   - heparin 5000u TID





Dispo: Pt currently requires inpatient care for management of his emergent 

condition.





Visit type





- Emergency Visit


Emergency Visit: Yes


ED Registration Date: 03/14/17


Care time: The patient presented to the Emergency Department on the above date 

and was hospitalized for further evaluation of their emergent condition.





- New Patient


This patient is new to me today: Yes


Date on this admission: 03/14/17





- Critical Care


Critical Care patient: No

## 2017-03-14 NOTE — PDOC
History of Present Illness





- General


Chief Complaint: Nausea/Vomiting


Stated Complaint: VOMITING


Time Seen by Provider: 03/14/17 12:08


History Source: Patient


Exam Limitations: No Limitations





- History of Present Illness


Initial Comments: 


52 yo M history testicular CA s/p resection with lymph node resection, prior 

SBO presenting with abd discomfort x1 day associated with nausea, vomiting. He 

states that he has some epigastric discomfort, but not pain. He states that he 

had similar symptoms in the past when he was diagnosed with his SBO. It did not 

resolve with conservative treatment, so he ended up having surgical intervention

, at which time he was found to have small area of necrotic bowel that had to 

be resected by Dr. Perez. At present, he denies fever, chills, diarrhea. He had 

a soft stool this morning. +Passing gas.








Past History





- Past Medical History


Allergies/Adverse Reactions: 


 Allergies











Allergy/AdvReac Type Severity Reaction Status Date / Time


 


No Known Allergies Allergy   Verified 03/14/17 12:01











Home Medications: 


Ambulatory Orders





Meloxicam [Mobic] 15 mg PO DAILY 01/11/17 


Sulfasalazine 1,000 mg PO DAILY 01/11/17 








Cancer: Yes (TESTICULAR)





- Surgical History


Abdominal Surgery: Yes (RPLND)





- Psycho/Social/Smoking Cessation Hx


Anxiety: No


Suicidal Ideation: No


Smoking History: Never smoked


Have you smoked in the past 12 months: No


Hx Alcohol Use: Yes (social)


Drug/Substance Use Hx: No


Substance Use Type: None





**Review of Systems





- Review of Systems


Able to Perform ROS?: Yes


Comments:: 


GENERAL/CONSTITUTIONAL: No fever or chills. No weakness.


HEAD, EYES, EARS, NOSE AND THROAT: No change in vision. No ear pain or 

discharge. No sore throat.


CARDIOVASCULAR: No chest pain or shortness of breath.


RESPIRATORY: No cough, wheezing, or hemoptysis.


GASTROINTESTINAL: +Nausea, vomiting. No diarrhea or constipation.


GENITOURINARY: No dysuria, frequency, or change in urination.


MUSCULOSKELETAL: No joint or muscle swelling or pain. No neck or back pain.


SKIN: No rash


NEUROLOGIC: No headache, vertigo, loss of consciousness, or change in strength/

sensation.


ENDOCRINE: No increased thirst. No abnormal weight change.


HEMATOLOGIC/LYMPHATIC: No anemia, easy bleeding, or history of blood clots.


ALLERGIC/IMMUNOLOGIC: No hives or skin allergy.








*Physical Exam





- Physical Exam


Comments: 


GENERAL: Awake, alert, and fully oriented, in no acute distress


HEAD: No signs of trauma


EYES: PERRLA, EOMI, sclera anicteric, conjunctiva clear


ENT: Auricles normal inspection, hearing grossly normal, nares patent, 

oropharynx clear without exudates. Moist mucosa


NECK: Normal ROM, supple, no lymphadenopathy, JVD, or masses


LUNGS: Breath sounds equal, clear to auscultation bilaterally.  No wheezes, and 

no crackles


HEART: Regular rate and rhythm, normal S1 and S2, no murmurs, rubs or gallops


ABDOMEN: Soft, mild LUQ and RLQ tenderness, normoactive bowel sounds.  No 

guarding, no rebound.  No masses


EXTREMITIES: Normal range of motion, no edema.  No clubbing or cyanosis. No 

cords, erythema, or tenderness


NEUROLOGICAL: Cranial nerves II through XII grossly intact.  Normal speech, 

normal gait


SKIN: Warm, Dry, normal turgor, no rashes or lesions noted.








Procedures





- NG Lavage


NG Lavage: bilious non-bloody


Progress: 


03/14/17 16:11


Pt was premedicated with viscous lidocaine. NGT placement attempted x2. Initial 

attempt was unsuccessful. Second attempt was successful, confirmed with 

borborygmi. With 300 cc bilious emesis in the canister. XR ordered to confirm 

placement. Of note, just prior to attempting NGT placement patient had vomited 

approx 300 cc. 














ED Treatment Course





- LABORATORY


CBC & Chemistry Diagram: 


 03/14/17 12:21





 03/14/17 12:21





Medical Decision Making





- Medical Decision Making


03/14/17 12:44


Pt declines pain meds, antiemetics at present. Will cont to monitor. Awaiting 

labs, CT.








03/14/17 15:37


Case d/w Dr. Perez- will place NGT and manage conservatively for now. Would 

prefer to avoid surgical intervention in light of complicated history and 

history of multiple adhesions- risk of causing additional adhesions and further 

complications.








03/14/17 16:43


CXR reviewed- NGT in place. Patient has been accepted to hospitalist service. 





*DC/Admit/Observation/Transfer


Diagnosis at time of Disposition: 


 Small bowel obstruction due to adhesions





- Discharge Dispostion


Condition at time of disposition: Stable


Admit: Yes

## 2017-03-15 LAB
ALBUMIN SERPL-MCNC: 3.3 G/DL (ref 3.5–5)
ALP SERPL-CCNC: 47 U/L (ref 32–92)
ALT SERPL-CCNC: 17 U/L (ref 10–40)
ANION GAP SERPL CALC-SCNC: 3 MMOL/L (ref 8–16)
AST SERPL-CCNC: 19 U/L (ref 10–42)
BASOPHILS # BLD: 0.4 % (ref 0–2)
BILIRUB SERPL-MCNC: 1.3 MG/DL (ref 0.2–1)
CALCIUM SERPL-MCNC: 8.2 MG/DL (ref 8.4–10.2)
CO2 SERPL-SCNC: 24 MMOL/L (ref 22–28)
CREAT SERPL-MCNC: 0.8 MG/DL (ref 0.6–1.3)
DEPRECATED RDW RBC AUTO: 13 % (ref 11.9–15.9)
EOSINOPHIL # BLD: 1.3 % (ref 0–4.5)
GLUCOSE SERPL-MCNC: 83 MG/DL (ref 74–106)
MAGNESIUM SERPL-MCNC: 1.8 MG/DL (ref 1.8–2.4)
MCH RBC QN AUTO: 30.9 PG (ref 25.7–33.7)
MCHC RBC AUTO-ENTMCNC: 32.1 G/DL (ref 32–35.9)
MCV RBC: 96.2 FL (ref 80–96)
NEUTROPHILS # BLD: 66.4 % (ref 42.8–82.8)
PLATELET # BLD AUTO: 235 K/MM3 (ref 134–434)
PMV BLD: 8 FL (ref 7.5–11.1)
PROT SERPL-MCNC: 5.3 G/DL (ref 6.4–8.3)
WBC # BLD AUTO: 6.7 K/MM3 (ref 4–10)

## 2017-03-15 RX ADMIN — HEPARIN SODIUM SCH UNIT: 5000 INJECTION, SOLUTION INTRAVENOUS; SUBCUTANEOUS at 22:08

## 2017-03-15 RX ADMIN — HEPARIN SODIUM SCH UNIT: 5000 INJECTION, SOLUTION INTRAVENOUS; SUBCUTANEOUS at 06:27

## 2017-03-15 RX ADMIN — HEPARIN SODIUM SCH: 5000 INJECTION, SOLUTION INTRAVENOUS; SUBCUTANEOUS at 15:35

## 2017-03-15 RX ADMIN — SODIUM CHLORIDE SCH: 9 INJECTION, SOLUTION INTRAVENOUS at 15:35

## 2017-03-15 RX ADMIN — PANTOPRAZOLE SODIUM SCH MLS/HR: 40 INJECTION, POWDER, FOR SOLUTION INTRAVENOUS at 09:32

## 2017-03-15 NOTE — PN
[id number] is a51 year old male with a past medical history of right 

testicular cancer s/p retroperitienal lymph node resection, SBO s/p small bowel 

resection ileocolic resection w/extensive ARI (1/13/17) patient was admitted 

from the emergency department for a high grade, partial SBO.  





 Vital Signs











 Period  Temp  Pulse  Resp  BP Sys/Philippe  Pulse Ox


 


 Last 24 Hr  98.6 F-99.3 F  65-72  16-18  118-166/58-82  











GENERAL: The patient is awake, alert, and fully oriented, in no acute distress.


HEAD: Normal with no signs of trauma.


EYES: PERRL, extraocular movements intact, sclera anicteric, conjunctiva clear. 

No ptosis. 


ENT: Ears normal, nares patent, oropharynx clear without exudates, moist mucous 


membranes.


NECK: Trachea midline, full range of motion, supple. 


LUNGS: Breath sounds equal, clear to auscultation bilaterally, no wheezes, no 

crackles, no 


accessory muscle use. 


HEART: Regular rate and rhythm, S1, S2 without murmur, rub or gallop.


ABDOMEN: Soft, diffuse abdomimal tenderness, midline surgical incision, well 

healed, nondistended, hypoactive  bowel sounds, no guarding, no rebound, no 

hepatosplenomegaly, no masses. NGT, biliaous drainagage, 300ml in 16hours 


EXTREMITIES: 2+ pulses, warm, well-perfused, no edema. 


NEUROLOGICAL: Cranial nerves II through XII grossly intact. Normal speech, gait 

not 


observed.


PSYCH: Normal mood, normal affect.


SKIN: Warm, dry, normal turgor, no rashes or lesions noted








               Active Medications











Generic Name Dose Route Start Last Admin





  Trade Name Freq  PRN Reason Stop Dose Admin


 


Heparin Sodium (Porcine)  5,000 unit 03/14/17 22:00 03/15/17 06:27





  Heparin -  SQ   5,000 unit





  TID DEIDRA   Administration


 


Sodium Chloride  1,000 mls @ 100 mls/hr 03/14/17 14:15 03/14/17 14:15





  Normal Saline -  IV   100 mls/hr





  ASDIR DEIDRA   Administration


 


Pantoprazole Sodium  100 mls @ 200 mls/hr 03/15/17 10:00  





  Protonix 40mg Ivpb (Pre-Docked)  IVPB   





  DAILY DEIDRA   


 


Morphine Sulfate  4 mg 03/14/17 16:34  





  Morphine Injection -  IVPUSH   





  Q6H PRN   





  PAIN   


 


Ondansetron HCl  4 mg 03/14/17 16:32  





  Zofran Injection  IVPB   





  Q6H PRN   





  NAUSEA   








IMAGING


ct scan of abd/pelvis w/oral and IV contrast, high grade partial SBO


xray abd flat/upright (3/15), resolved SBO 





ASSESSMENT/PLAN:





1) GI:SBO


- xray of abd reviewed,  sbo resolved, bowel sounds noted on exam, case 

discussed with Dr Perez at bedside, clear liquuids and clamp NGT, if patient 

tolerates clear liquids, ngt can be removed within 24 hours and diet may be 

advanced


- continue IVF


- prn morphine


- Dr Perez, surgeon consulted and followed. 





PPX


- protonix


-  heparin 5000u TID


- oob/scd





f/e/n


- ivf


- replete lytes prn





Dispo: Pt currently requires inpatient care for management of his emergent 

condition.








Visit type





- Emergency Visit


Emergency Visit: Yes


ED Registration Date: 03/14/17


Care time: The patient presented to the Emergency Department on the above date 

and was hospitalized for further evaluation of their emergent condition.





- New Patient


This patient is new to me today: Yes


Date on this admission: 03/15/17





- Critical Care


Critical Care patient: No





- Discharge Referral


Referred to [facility] No

## 2017-03-15 NOTE — CONSULT
Consult


Consult Specialty:: Surgery


Reason for Consultation:: Bowel obstruction





- History of Present Illness


Chief Complaint: Abdominal pain and distention


History of Present Illness: 


51 male s/p exploratory laparotomy, extensive lysis of adhesions, bowel 

resection x 2 in January 2017 for high grade bowel obstruction


Presents for abdominal pain and distention x 1 day


Found to have bowel obstruction on CT


NG tube placed


Now feels better


Has passed flatus


AXR- shows contrast in left colon





- History Source


History Provided By: Patient, Medical Record


Limitations to Obtaining History: No Limitations





- Past Medical History


Gastrointestinal: Yes: Other (Adhesions post Testis Ca Sx 2010)


Musculoskeletal: Yes: Other (Ankylosing Spondylitis)





- Alcohol/Substance Use


Hx Alcohol Use: Yes (social)





- Smoking History


Smoking history: Never smoked


Have you smoked in the past 12 months: No





- Social History


ADL: Independent


History of Recent Travel: No





Home Medications





- Allergies


Allergies/Adverse Reactions: 


 Allergies











Allergy/AdvReac Type Severity Reaction Status Date / Time


 


No Known Allergies Allergy   Verified 03/14/17 12:01














- Home Medications


Home Medications: 


Ambulatory Orders





Meloxicam [Mobic] 15 mg PO DAILY 01/11/17 


Sulfasalazine 1,000 mg PO DAILY 01/11/17 











Family Disease History





- Family Disease History


Family History: Unremarkable





Review of Systems





- Review of Systems


Constitutional: denies: Chills, Fever


HENT: reports: No Symptoms


Neck: reports: No Symptoms


Cardiovascular: denies: Chest Pain


Respiratory: denies: Cough


Gastrointestinal: reports: Abdominal Pain, Bloating, Nausea.  denies: Vomiting 

Blood


Genitourinary: reports: No Symptoms


Neurological: denies: Change in LOC


Pain Intensity: 3





Physical Exam


Vital Signs: 


 Vital Signs











Temperature  98.6 F   03/15/17 06:00


 


Pulse Rate  65   03/15/17 06:00


 


Respiratory Rate  16   03/15/17 06:00


 


Blood Pressure  166/58   03/15/17 06:00


 


O2 Sat by Pulse Oximetry (%)  96   03/15/17 08:30











Constitutional: Yes: Calm


HENT: Yes: WNL


Neck: Yes: Supple


Cardiovascular: Yes: Regular Rate and Rhythm


Respiratory: Yes: CTA Bilaterally


Gastrointestinal: Yes: Soft.  No: Distention, Tenderness, Tenderness, Rebound


Extremities: Yes: WNL


Neurological: Yes: Alert, Oriented


Labs: 


 CBC, BMP





 03/15/17 07:25 





 03/15/17 07:25 











Imaging





- Results


X-ray: Report Reviewed, Image Reviewed


Cat Scan: Report Reviewed, Image Reviewed





Problem List





- Problems


(1) Small bowel obstruction due to adhesions


Code(s): K56.5 - INTESTINAL ADHESIONS W OBST (POSTPROCEDURAL) (POSTINFECTION)








Assessment/Plan


51 male with bowel obstruction- now with flatus


Serial abdominal exams


Clamp NG tube


Trial of clears


OOB

## 2017-03-16 VITALS — HEART RATE: 75 BPM | SYSTOLIC BLOOD PRESSURE: 106 MMHG | DIASTOLIC BLOOD PRESSURE: 64 MMHG

## 2017-03-16 VITALS — TEMPERATURE: 98.6 F

## 2017-03-16 LAB
ALBUMIN SERPL-MCNC: 3.4 G/DL (ref 3.5–5)
ALP SERPL-CCNC: 46 U/L (ref 32–92)
ALT SERPL-CCNC: 15 U/L (ref 10–40)
ANION GAP SERPL CALC-SCNC: 13 MMOL/L (ref 8–16)
AST SERPL-CCNC: 16 U/L (ref 10–42)
BASOPHILS # BLD: 0.9 % (ref 0–2)
BILIRUB SERPL-MCNC: 0.6 MG/DL (ref 0.2–1)
CALCIUM SERPL-MCNC: 9.1 MG/DL (ref 8.4–10.2)
CO2 SERPL-SCNC: 26 MMOL/L (ref 22–28)
CREAT SERPL-MCNC: 0.8 MG/DL (ref 0.6–1.3)
DEPRECATED RDW RBC AUTO: 12.4 % (ref 11.9–15.9)
EOSINOPHIL # BLD: 2 % (ref 0–4.5)
GLUCOSE SERPL-MCNC: 99 MG/DL (ref 74–106)
MAGNESIUM SERPL-MCNC: 1.8 MG/DL (ref 1.8–2.4)
MCH RBC QN AUTO: 31.4 PG (ref 25.7–33.7)
MCHC RBC AUTO-ENTMCNC: 32.7 G/DL (ref 32–35.9)
MCV RBC: 95.9 FL (ref 80–96)
NEUTROPHILS # BLD: 65.8 % (ref 42.8–82.8)
PHOSPHATE SERPL-MCNC: 3.4 MG/DL (ref 2.5–4.6)
PLATELET # BLD AUTO: 219 K/MM3 (ref 134–434)
PMV BLD: 8 FL (ref 7.5–11.1)
PROT SERPL-MCNC: 5.3 G/DL (ref 6.4–8.3)
WBC # BLD AUTO: 5.6 K/MM3 (ref 4–10)

## 2017-03-16 RX ADMIN — HEPARIN SODIUM SCH UNIT: 5000 INJECTION, SOLUTION INTRAVENOUS; SUBCUTANEOUS at 06:08

## 2017-03-16 RX ADMIN — PANTOPRAZOLE SODIUM SCH MLS/HR: 40 INJECTION, POWDER, FOR SOLUTION INTRAVENOUS at 09:15

## 2017-03-16 RX ADMIN — HEPARIN SODIUM SCH UNIT: 5000 INJECTION, SOLUTION INTRAVENOUS; SUBCUTANEOUS at 14:00

## 2017-03-16 NOTE — PN
[id number] pain, NGT removed by Dr Perez (surgeon) pt is tolerating full 

liquid tray.  





OBJECTIVE: patient is a51 year old male with a past medical history of right 

testicular cancer s/p retroperitienal lymph node resection, SBO s/p small bowel 

resection ileocolic resection w/extensive ARI (1/13/17) patient was admitted 

from the emergency department for a high grade, partial SBO.  








 Vital Signs











 Period  Temp  Pulse  Resp  BP Sys/Philippe  Pulse Ox


 


 Last 24 Hr  98.4 F-98.9 F  57-69  17-18  116-124/66-75  95-97














GENERAL: The patient is awake, alert, and fully oriented, in no acute distress.


HEAD: Normal with no signs of trauma.


EYES: PERRL, extraocular movements intact, sclera anicteric, conjunctiva clear. 

No ptosis. 


ENT: Ears normal, nares patent, oropharynx clear without exudates, moist mucous 


membranes.


NECK: Trachea midline, full range of motion, supple. 


LUNGS: Breath sounds equal, clear to auscultation bilaterally, no wheezes, no 

crackles, no 


accessory muscle use. 


HEART: Regular rate and rhythm, S1, S2 without murmur, rub or gallop.


ABDOMEN: Soft, diffuse abdomimal tenderness, midline surgical incision, well 

healed, nondistended, hypoactive  bowel sounds, no guarding, no rebound, no 

hepatosplenomegaly, no masses. 


EXTREMITIES: 2+ pulses, warm, well-perfused, no edema. 


NEUROLOGICAL: Cranial nerves II through XII grossly intact. Normal speech, gait 

not 


observed.


PSYCH: Normal mood, normal affect.


SKIN: Warm, dry, normal turgor, no rashes or lesions noted














 Laboratory Results - last 24 hr








 CBC











WBC  5.6 K/mm3 (4.0-10.0)   03/16/17  07:44    


 


RBC  3.72 M/mm3 (4.00-5.60)  L  03/16/17  07:44    


 


Hgb  11.6 GM/dl (11.7-16.9)  L  03/16/17  07:44    


 


Hct  35.6 % (35.4-49)   03/16/17  07:44    


 


MCV  95.9 fl (80-96)   03/16/17  07:44    


 


MCHC  32.7 g/dl (32.0-35.9)   03/16/17  07:44    


 


RDW  12.4 % (11.9-15.9)   03/16/17  07:44    


 


Plt Count  219 K/MM3 (134-434)   03/16/17  07:44    


 


MPV  8.0 fl (7.5-11.1)   03/16/17  07:44    


 


Neutrophils %  65.8 % (42.8-82.8)   03/16/17  07:44    


 


Lymphocytes %  21.0 % (8-40)   03/16/17  07:44    


 


Monocytes %  10.3 % (3.8-10.2)  H  03/16/17  07:44    


 


Eosinophils %  2.0 % (0-4.5)   03/16/17  07:44    


 


Basophils %  0.9 % (0-2.0)   03/16/17  07:44    








 CMP











Sodium  141 mmol/L (136-145)   03/16/17  07:44    


 


Potassium  4.1 mmol/L (3.5-5.1)   03/16/17  07:44    


 


Chloride  102 mmol/L ()   03/16/17  07:44    


 


Carbon Dioxide  26 mmol/L (22-28)   03/16/17  07:44    


 


Anion Gap  13  (8-16)   03/16/17  07:44    


 


BUN  7 mg/dl (7-18)  D 03/16/17  07:44    


 


Creatinine  0.8 mg/dl (0.6-1.3)   03/16/17  07:44    


 


Creat Clearance w eGFR  > 60  (>60)   03/16/17  07:44    


 


Random Glucose  99 mg/dl ()   03/16/17  07:44    


 


Lactic Acid  1.182 mmol/L (0.4-2.0)   03/14/17  12:30    


 


Calcium  9.1 mg/dl (8.4-10.2)   03/16/17  07:44    


 


Phosphorus  3.4 mg/dl (2.5-4.6)   03/16/17  07:44    


 


Magnesium  1.8 mg/dL (1.8-2.4)   03/16/17  07:44    


 


Total Bilirubin  0.6 mg/dl (0.2-1.0)  D 03/16/17  07:44    


 


AST  16 U/L (10-42)   03/16/17  07:44    


 


ALT  15 U/L (10-40)   03/16/17  07:44    


 


Alkaline Phosphatase  46 U/L (32-92)   03/16/17  07:44    


 


Total Protein  5.3 g/dl (6.4-8.3)  L  03/16/17  07:44    


 


Albumin  3.4 g/dl (3.5-5.0)  L  03/16/17  07:44    


 


Lipase  30 U/L (22-51)   03/14/17  12:21    











Active Medications











Generic Name Dose Route Start Last Admin





  Trade Name Freq  PRN Reason Stop Dose Admin


 


Heparin Sodium (Porcine)  5,000 unit 03/14/17 22:00 03/16/17 06:08





  Heparin -  SQ   5,000 unit





  TID DEIDRA   Administration


 


Sodium Chloride  1,000 mls @ 100 mls/hr 03/14/17 14:15 03/15/17 15:35





  Normal Saline -  IV   Not Given





  ASDIR DEIDRA   


 


Pantoprazole Sodium  100 mls @ 200 mls/hr 03/15/17 10:00 03/16/17 09:15





  Protonix 40mg Ivpb (Pre-Docked)  IVPB   200 mls/hr





  DAILY DEIDRA   Administration


 


Potassium Chloride/Dextrose/Sod Cl  1,000 mls @ 50 mls/hr 03/16/17 10:08  





  D5-1/2ns+20 Meq Kcl -  IV   





  ASDIR DEIDRA   


 


Morphine Sulfate  4 mg 03/14/17 16:34  





  Morphine Injection -  IVPUSH   





  Q6H PRN   





  PAIN   


 


Ondansetron HCl  4 mg 03/14/17 16:32  





  Zofran Injection  IVPB   





  Q6H PRN   





  NAUSEA   











IMAGING


ct scan of abd/pelvis w/oral and IV contrast, high grade partial SBO


xray abd flat/upright (3/15), resolved SBO 





ASSESSMENT/PLAN:





1) GI:SBO


- toleraing full liquid diet, will advance to soft


- continue IVF


- prn morphine


- Dr Perez, surgeon consulted and followed. 





PPX


- protonix


-  heparin 5000u TID


- oob/scd





f/e/n


- ivf


- replete lytes prn





Dispo: Pt currently requires inpatient care for management of his emergent 

condition. if tolerating diet d/c home








++





Visit type





- Emergency Visit


Emergency Visit: Yes


ED Registration Date: 03/14/17


Care time: The patient presented to the Emergency Department on the above date 

and was hospitalized for further evaluation of their emergent condition.





- New Patient


This patient is new to me today: No





- Critical Care


Critical Care patient: No





- Discharge Referral


Referred to [facility] No

## 2017-03-16 NOTE — PN
Progress Note (short form)





- Note


Progress Note: 


No acute events


Passed flatus


Tolerated clears


NG tube removed


AVSS


Abd soft, NT, ND


 CBC, BMP





 03/16/17 07:44 





 03/16/17 07:44 





Fulls-> soft diet


If tolerates, can discharge home





Problem List





- Problems


(1) Small bowel obstruction due to adhesions


Code(s): K56.5 - INTESTINAL ADHESIONS W OBST (POSTPROCEDURAL) (POSTINFECTION)

## 2017-05-14 NOTE — DS
DATE OF ADMISSION:  01/11/2017

 

DATE OF DISCHARGE:  01/20/2017

 

DISCHARGE DIAGNOSES:

1.  Status post small bowel obstruction due to adhesions.

2.  Synechia.

3.  Past history of testicular cancer with orchiectomy and bola resection back in

2010. 

4.  Ankylosing spondylitis.

5.  Fever, short-term.

6.  Pain management.

7.  Hypokalemia (resolved).

8.  Hypermagnesemia (resolved).

9.  Hypernatremia (resolved). 

10.  Venous thromboembolism prophylaxis provided within 24 hours.

 

DISCHARGE HOME MEDICATIONS:  As per Dr. Perez; Levaquin 500 mg daily, Flagyl 500 mg 3

times a day, oxycodone 5/325 mg every 6 hours as needed.  The patient was to continue

with his medications prior to admission, which were meloxicam 15 mg a day and

sulfasalazine 1000 mg p.o. daily. 

 

HOSPITAL COURSE:  A 51-year-old male with a significant past history of testicular CA

in 2010, on remission, status post orchiectomy and lymph node resection as well as

chemotherapy, who has had episodic left lower quadrant abdominal discomfort in the

past, which he was informed it was associated with adhesions associated with his

testicular cancer surgical treatment.   The patient usually has a daily bowel

movement and was doing well until 2 days prior to admission in which he started

experiencing periumbilical abdominal pain with associated vomiting several times a

day prior to admission.  Because these symptoms persisted, the patient came to the

emergency department and after evaluation with imaging studies and physical

examination, was determined that he had small bowel obstruction, most likely due to

synechia.  The patient was admitted with an n.p.o. diet and a nasogastric tube was

inserted and placed on low pressure.  The patient remained in the hospital for

several days until he was cleared for meals; first initially clear liquids and then

it was advanced as tolerated.  Surgical consultation was requested during this time

and the surgeon evaluated the patient as well as assisting with his management during

the hospital stay.  He was eventually discharged home and the surgeon instructed the

patient to follow up with him.  The patient actually left the hospital with a follow

up appointment scheduled for 01/30/2017 with Dr. Perez.  When the patient left, he had

several bowel movements, had tolerated a regular diet as well.  During the

hospitalization, the patient developed a fever as well that was actually improved

with antipyretics and medications.  During the hospitalization, the patient was also

placed on IV fluids and IV antibiotics.  Please see the orders. 

 

 

ROBERTO [id number]

DD: 05/14/2017 18:43

DT: 05/14/2017 19:08

Job #:  [id number]

## 2020-12-17 ENCOUNTER — HOSPITAL ENCOUNTER (EMERGENCY)
Dept: HOSPITAL 74 - JVIRT | Age: 55
Discharge: HOME | End: 2020-12-17
Payer: COMMERCIAL

## 2020-12-17 DIAGNOSIS — Z03.818: Primary | ICD-10-CM

## 2020-12-17 PROCEDURE — U0003 INFECTIOUS AGENT DETECTION BY NUCLEIC ACID (DNA OR RNA); SEVERE ACUTE RESPIRATORY SYNDROME CORONAVIRUS 2 (SARS-COV-2) (CORONAVIRUS DISEASE [COVID-19]), AMPLIFIED PROBE TECHNIQUE, MAKING USE OF HIGH THROUGHPUT TECHNOLOGIES AS DESCRIBED BY CMS-2020-01-R: HCPCS

## 2020-12-17 PROCEDURE — C9803 HOPD COVID-19 SPEC COLLECT: HCPCS

## 2023-11-04 ENCOUNTER — APPOINTMENT (OUTPATIENT)
Dept: AFTER HOURS CARE | Facility: EMERGENCY ROOM | Age: 58
End: 2023-11-04
Payer: COMMERCIAL

## 2023-11-04 DIAGNOSIS — U07.1 COVID-19: ICD-10-CM

## 2023-11-04 DIAGNOSIS — Z87.39 PERSONAL HISTORY OF OTHER DISEASES OF THE MUSCULOSKELETAL SYSTEM AND CONNECTIVE TISSUE: ICD-10-CM

## 2023-11-04 PROBLEM — Z00.00 ENCOUNTER FOR PREVENTIVE HEALTH EXAMINATION: Status: ACTIVE | Noted: 2023-11-04

## 2023-11-04 PROCEDURE — 99203 OFFICE O/P NEW LOW 30 MIN: CPT | Mod: 95

## 2023-11-04 RX ORDER — MELOXICAM 15 MG/1
TABLET ORAL
Refills: 0 | Status: ACTIVE | COMMUNITY

## 2023-12-07 ENCOUNTER — NON-APPOINTMENT (OUTPATIENT)
Age: 58
End: 2023-12-07

## 2023-12-14 ENCOUNTER — APPOINTMENT (OUTPATIENT)
Dept: PAIN MANAGEMENT | Facility: CLINIC | Age: 58
End: 2023-12-14
Payer: COMMERCIAL

## 2023-12-14 VITALS
HEIGHT: 72 IN | SYSTOLIC BLOOD PRESSURE: 146 MMHG | DIASTOLIC BLOOD PRESSURE: 82 MMHG | WEIGHT: 180 LBS | BODY MASS INDEX: 24.38 KG/M2

## 2023-12-14 PROCEDURE — 99204 OFFICE O/P NEW MOD 45 MIN: CPT

## 2023-12-14 RX ORDER — NIRMATRELVIR AND RITONAVIR 300-100 MG
20 X 150 MG & KIT ORAL
Qty: 1 | Refills: 0 | Status: DISCONTINUED | COMMUNITY
Start: 2023-11-04 | End: 2023-12-14

## 2023-12-14 NOTE — HISTORY OF PRESENT ILLNESS
[Back Pain] : back pain [___ mths] : [unfilled] month(s) ago [Constant] : constant [8] : a current pain level of 8/10 [6] : an average pain level of 6/10 [2] : a minimum pain level of 2/10 [9] : a maximum pain level of 9/10 [Sharp] : sharp [Dull] : dull [Aching] : aching [Burning] : burning [Stabbing] : stabbing [Standing] : standing [Laying] : laying [Walking] : walking [Medications] : medications [FreeTextEntry1] : HPI     Mr. YEN PANCHAL is a 58 year M with pmhx of ankylosis spondylosis, testicular cancer (2010- s/p chemo, resection with node dissection). C/o worsening left sided lower back pain x 2 months. Pain worst with prolonged standing. Improved with walking, laying, sitting. No relief medications. Pain is impacting his quality of life. Denies any additional weakness, numbness, bowel/bladder dysfunction, history of bleeding disorders.   Previous and current pain medications/doses/effects: Mobic, Tylenol     Previous Pain Treatments: None     Previous Pain Injections: None     Previous Diagnostic Studies/Images: 12/6/2023 View Order  Exam: XD L S SPINE 4 OR MORE VWS; XD SACROILIAC JOINTS  IMPRESSION: No compression fractures.  Significantly narrowed L4-L5 disc space with grade 1 anterolisthesis of L4 on L5 however without associated spondylolysis defects. Remaining vertebral body alignment and disc space heights maintained.  Symmetric normally aligned SI joints with preserved joint spaces and intact articular cortical margins. No gross radiographic stigmata of sacroiliitis. Normally aligned and spaced pubic symphysis. Preserved bilateral hip joint spaces and no gross radiographic evidence for AVN.  No lytic or blastic lesions.  Multiple surgical clips in anterior paraspinal and right lower quadrant regions, correlate to prior surgical history.    ======================================================================================    [FreeTextEntry7] : lower back pain left side  [FreeTextEntry4] : mobic, tylenol

## 2023-12-14 NOTE — ASSESSMENT
[FreeTextEntry1] : >> Imaging and Other Studies   pending MRI LS  I personally reviewed the relevant imaging.  Discussed and explained to patient the likely source of pathology and pain.  Questions answered. XR  >> Therapy and Other Modalities   PT  >> Medications  acetaminophen 650mg q8h prn pain (caution <3g daily)   >> Interventions   SIgnificant pain maladaptive, with + SIJ provocative tests and refractory to conservative treatments.  Will schedule LEFT SIJ steroid injection r/b/a discussed  >> Consults  continue care with ortho   >> Discussion of Risks/Benefits/Alternatives    >Regarding any scheduled procedures:  In the event the patient is scheduled for a procedure,   I have discussed in detail with the patient that any interventional pain procedure is associated with potential risks.  The procedure may include an injection of steroids and potentially other medications (local anesthetic and normal saline) into the epidural space or surrounding tissue of the spine.  There are significant risks of this procedure which include and are not limited to infection, bleeding, worsening pain, dural puncture leading to postdural puncture headache, nerve damage, spinal cord injury, paralysis, stroke, and death.    There is a chance that the procedure does not improve their pain.    There are risks associated with the steroid being absorbed into the body systemically.  These include dysphoria, difficulty sleeping, mood swings and personality changes.  Premenopausal women may notice an irregularity in her menstrual cycle for 2-3 months following the injection.  Steroids can specifically affect patients with hypertension, diabetes, and peptic ulcers.  The procedure may cause a temporary increase in blood pressure and blood pressure, and may adversely affect a peptic ulcer.  Other, more rare complications, include avascular necrosis of joints, glaucoma and worsening of osteoporosis.   I have discussed the risks of the procedure at length with the patient, and the potential benefits of pain relief.  I have offered alternatives to the procedure.  All questions were answered.    The patient expressed understanding and wishes to proceed with the procedure.   >> Conclusion   The above diagnosis and treatment plan is medically reasonable and necessary based on the patient encounter There were no barriers to communication. Informed patient that I would be available for any additional questions. Patient was instructed to call with any worsening symptoms including severe pain, new numbness/weakness, or changes in the bowel/bladder function. Discussed role of nsaids in pain management and all relevant risks, if patient is continuing to require after 4 weeks the patient should f/u for alternative treatment. Instructed patient to maintain pain diary to monitor pain level, mobility, and function.

## 2023-12-14 NOTE — CONSULT LETTER
[Dear  ___] : Dear  [unfilled], [Consult Letter:] : I had the pleasure of evaluating your patient, [unfilled]. [Please see my note below.] : Please see my note below. [Consult Closing:] : Thank you very much for allowing me to participate in the care of this patient.  If you have any questions, please do not hesitate to contact me. [Sincerely,] : Sincerely, [FreeTextEntry3] :  Brett Leach DO, MBA Director, [facility]  of Anesthesiology [facility] [employer] [facility]

## 2023-12-14 NOTE — PHYSICAL EXAM
[Normal] : Well developed, in no acute distress, alert and oriented to person, place and time [Normal muscle bulk without asymmetry] : normal muscle bulk without asymmetry [Facet Tenderness] : facet tenderness [Spine: Flexion to ___ degrees, without pain] : spine: flexion to [unfilled] degrees, without pain [SENG Test] : positive SENG Test (Gokul's Test) [SI Provacative Testing] : positive SI Provacative Testing [] : Motor:

## 2023-12-20 ENCOUNTER — APPOINTMENT (OUTPATIENT)
Dept: PAIN MANAGEMENT | Facility: CLINIC | Age: 58
End: 2023-12-20

## 2024-01-02 ENCOUNTER — APPOINTMENT (OUTPATIENT)
Dept: PAIN MANAGEMENT | Facility: CLINIC | Age: 59
End: 2024-01-02
Payer: COMMERCIAL

## 2024-01-02 VITALS
OXYGEN SATURATION: 98 % | DIASTOLIC BLOOD PRESSURE: 86 MMHG | BODY MASS INDEX: 24.38 KG/M2 | HEIGHT: 72 IN | RESPIRATION RATE: 16 BRPM | SYSTOLIC BLOOD PRESSURE: 135 MMHG | WEIGHT: 180 LBS | HEART RATE: 64 BPM

## 2024-01-02 PROCEDURE — 27096 INJECT SACROILIAC JOINT: CPT | Mod: LT

## 2024-01-02 RX ORDER — GABAPENTIN 300 MG/1
300 CAPSULE ORAL
Qty: 90 | Refills: 1 | Status: ACTIVE | COMMUNITY
Start: 2024-01-02 | End: 1900-01-01

## 2024-01-02 RX ADMIN — TRIAMCINOLONE ACETONIDE MG/ML: 10 INJECTION, SUSPENSION INTRA-ARTICULAR; INTRALESIONAL at 00:00

## 2024-01-02 RX ADMIN — BUPIVACAINE HYDROCHLORIDE 0 %: 7.5 INJECTION, SOLUTION EPIDURAL; INTRACAUDAL; PERINEURAL at 00:00

## 2024-01-02 NOTE — PROCEDURE
[FreeTextEntry1] : SACROILIAC JOINT  The patient was placed in the prone position on the table with a pillow under the abdomen.  Routine monitors were applied.  The back was draped in the usual sterile fashion and sterile technique was adhered to throughout the entire procedure.  The [LEFT] [=] sacroiliac joint was identified under ultrasound guidance.  . The skin and subcutaneous tissues were infiltrated with 1% Lidocaine.  After adequate local anesthesia a 3.5 inch spinal needle was inserted and introduced into the SIJ with ultrasound guidance. After negative aspiration for heme the patient was injected with a total of 20mg kenalog with 5cc 0.75% Bupivacaine.  The patient tolerated the procedure well.  There was no neurological deficit post procedure.  The patient went to recovery room in stable condition.  Discharge instructions were given to the patient.

## 2024-01-10 ENCOUNTER — APPOINTMENT (OUTPATIENT)
Dept: PAIN MANAGEMENT | Facility: CLINIC | Age: 59
End: 2024-01-10
Payer: COMMERCIAL

## 2024-01-10 VITALS
HEART RATE: 60 BPM | BODY MASS INDEX: 24.38 KG/M2 | WEIGHT: 180 LBS | HEIGHT: 72 IN | DIASTOLIC BLOOD PRESSURE: 82 MMHG | SYSTOLIC BLOOD PRESSURE: 122 MMHG

## 2024-01-10 DIAGNOSIS — M54.12 RADICULOPATHY, CERVICAL REGION: ICD-10-CM

## 2024-01-10 DIAGNOSIS — M79.2 NEURALGIA AND NEURITIS, UNSPECIFIED: ICD-10-CM

## 2024-01-10 DIAGNOSIS — M47.817 SPONDYLOSIS W/OUT MYELOPATHY OR RADICULOPATHY, LUMBOSACRAL REGION: ICD-10-CM

## 2024-01-10 DIAGNOSIS — M53.3 SACROCOCCYGEAL DISORDERS, NOT ELSEWHERE CLASSIFIED: ICD-10-CM

## 2024-01-10 DIAGNOSIS — M79.18 MYALGIA, OTHER SITE: ICD-10-CM

## 2024-01-10 DIAGNOSIS — G89.4 CHRONIC PAIN SYNDROME: ICD-10-CM

## 2024-01-10 PROCEDURE — G2211 COMPLEX E/M VISIT ADD ON: CPT | Mod: NC,1L

## 2024-01-10 PROCEDURE — 99214 OFFICE O/P EST MOD 30 MIN: CPT

## 2024-01-10 RX ORDER — GABAPENTIN 300 MG/1
300 CAPSULE ORAL
Qty: 90 | Refills: 1 | Status: ACTIVE | COMMUNITY
Start: 2024-01-10 | End: 1900-01-01

## 2024-01-10 NOTE — HISTORY OF PRESENT ILLNESS
[Back Pain] : back pain [___ mths] : [unfilled] month(s) ago [Constant] : constant [8] : a current pain level of 8/10 [6] : an average pain level of 6/10 [2] : a minimum pain level of 2/10 [9] : a maximum pain level of 9/10 [Sharp] : sharp [Dull] : dull [Aching] : aching [Burning] : burning [Stabbing] : stabbing [Standing] : standing [Laying] : laying [Walking] : walking [Medications] : medications [FreeTextEntry1] : Interval Note:  sp LEFT SIJ steroid injection with improvement in left buttock, back pain. Patient reports pain over left lower back radiating to left posterior thigh.  Pain is so bad that patient finds it difficult to perform adls and ambulate Since last visit the pain is improved. Denies any additional weakness, numbness, bowel/bladder dysfunction.     HPI     Mr. YEN PANCHAL is a 58 year M with pmhx of ankylosis spondylosis, testicular cancer (2010- s/p chemo, resection with node dissection). C/o worsening left sided lower back pain x 2 months. Pain worst with prolonged standing. Improved with walking, laying, sitting. No relief medications. Pain is impacting his quality of life. Denies any additional weakness, numbness, bowel/bladder dysfunction, history of bleeding disorders.   Previous and current pain medications/doses/effects: Mobic, Tylenol     Previous Pain Treatments: None     Previous Pain Injections:   LEFT SIJ steroid injection 1/2/23     Previous Diagnostic Studies/Images: MRI LS 1/24  There is grade 2 anterolisthesis of L4 on L5 (approximately 0.9 cm). There is no evidence of pars defects. There is normal curvature to the lumbar lordosis. The vertebral bodies are normal height. There is moderate to severe loss of disc height and T2 signal at the L4/L5 disc space and mild loss of T2 signal throughout the L1/L2 and L2/L3 disc spaces consistent with desiccation. There is a small Schmorl's nodes seen at the superior endplate of L2. The remaining intervertebral disc spaces are within normal limits. The conus terminates at the L1 level and demonstrates no evidence of abnormal signal changes.  Evaluation of the individual levels demonstrates at the L5/S1 level there is a mild diffuse disc bulge with a superimposed left subarticular disc protrusion (image #10 series 6 and image #53/9) indenting the left ventral thecal sac and compressing the left descending S1 nerve root. . There is obliteration of the left lateral recess. There is moderate facet and ligamentous hypertrophy. There is fluid seen within the facet joints. There is mild bilateral foraminal narrowing. The bilateral L5 foraminal exiting nerve roots appear within normal limits. There is no evidence of spinal canal stenosis.  At the L4/L5 level there is unroofing of the posterior aspect disc space due to the anterolisthesis. There is a diffuse disc bulge and osteophyte flattening the ventral thecal sac. There is mild bilateral foraminal narrowing. There is severe facet and ligamentous hypertrophy. There is fluid seen within the facet joints. The constellation of findings is causing mild to moderate spinal canal stenosis.  At the L3/L4, and L2/L3 and L3 1/L2 levels there are minimal disc bulges flattening the ventral thecal sac and mildly narrowing the bilateral lateral recesses. There is no evidence of foraminal narrowing. There is no evidence of spinal canal stenosis.  There is an approximately 2.5 cm partially visualized cystic lesion within the right inferior kidney. There is a subcentimeter cystic lesion seen in the left kidney..   Impression:  Grade 2 anterolisthesis of L4 on L5.  L5/S1 mild diffuse disc bulge with a superimposed left subarticular disc protrusion compressing the left descending S1 nerve root. . L5/S1 no evidence of spinal canal stenosis.  L4/L5 diffuse disc bulge .L4/L5 mild to moderate spinal canal stenosis.  12/6/2023 View Order  Exam: XD L S SPINE 4 OR MORE VWS; XD SACROILIAC JOINTS  IMPRESSION: No compression fractures.  Significantly narrowed L4-L5 disc space with grade 1 anterolisthesis of L4 on L5 however without associated spondylolysis defects. Remaining vertebral body alignment and disc space heights maintained.  Symmetric normally aligned SI joints with preserved joint spaces and intact articular cortical margins. No gross radiographic stigmata of sacroiliitis. Normally aligned and spaced pubic symphysis. Preserved bilateral hip joint spaces and no gross radiographic evidence for AVN.  No lytic or blastic lesions.  Multiple surgical clips in anterior paraspinal and right lower quadrant regions, correlate to prior surgical history.    ======================================================================================    [FreeTextEntry7] : lower back pain left side  [FreeTextEntry4] : mobic, tylenol

## 2024-01-10 NOTE — PHYSICAL EXAM
[Normal muscle bulk without asymmetry] : normal muscle bulk without asymmetry [] : Motor: [Normal] : Normal affect [Facet Tenderness] : no facet tenderness

## 2024-01-10 NOTE — ASSESSMENT
[FreeTextEntry1] : >> Imaging and Other Studies   I personally reviewed the relevant imaging.  Discussed and explained to patient the likely source of pathology and pain.  Questions answered. MRI   >> Therapy and Other Modalities   PT  >> Medications  acetaminophen 650mg q8h prn pain (caution <3g daily)   >> Interventions   Significant pain maladaptive, with + SIJ provocative tests and refractory to conservative treatments.  sp LEFT SIJ steroid injection with improvement in pain  Significant component of back and leg pain likely secondary to lumbar spinal stenosis demonstrated on MRI LS.  Will schedule L5-S1 interlaminar epidural steroid injection r/b/a discussed    >> Consults  continue care with ortho   >> Discussion of Risks/Benefits/Alternatives    >Regarding any scheduled procedures:  In the event the patient is scheduled for a procedure,   I have discussed in detail with the patient that any interventional pain procedure is associated with potential risks.  The procedure may include an injection of steroids and potentially other medications (local anesthetic and normal saline) into the epidural space or surrounding tissue of the spine.  There are significant risks of this procedure which include and are not limited to infection, bleeding, worsening pain, dural puncture leading to postdural puncture headache, nerve damage, spinal cord injury, paralysis, stroke, and death.    There is a chance that the procedure does not improve their pain.    There are risks associated with the steroid being absorbed into the body systemically.  These include dysphoria, difficulty sleeping, mood swings and personality changes.  Premenopausal women may notice an irregularity in her menstrual cycle for 2-3 months following the injection.  Steroids can specifically affect patients with hypertension, diabetes, and peptic ulcers.  The procedure may cause a temporary increase in blood pressure and blood pressure, and may adversely affect a peptic ulcer.  Other, more rare complications, include avascular necrosis of joints, glaucoma and worsening of osteoporosis.   I have discussed the risks of the procedure at length with the patient, and the potential benefits of pain relief.  I have offered alternatives to the procedure.  All questions were answered.    The patient expressed understanding and wishes to proceed with the procedure.   >> Conclusion   The above diagnosis and treatment plan is medically reasonable and necessary based on the patient encounter There were no barriers to communication. Informed patient that I would be available for any additional questions. Patient was instructed to call with any worsening symptoms including severe pain, new numbness/weakness, or changes in the bowel/bladder function. Discussed role of nsaids in pain management and all relevant risks, if patient is continuing to require after 4 weeks the patient should f/u for alternative treatment. Instructed patient to maintain pain diary to monitor pain level, mobility, and function.

## 2024-01-24 ENCOUNTER — APPOINTMENT (OUTPATIENT)
Dept: PAIN MANAGEMENT | Facility: CLINIC | Age: 59
End: 2024-01-24
Payer: COMMERCIAL

## 2024-01-24 VITALS
DIASTOLIC BLOOD PRESSURE: 71 MMHG | WEIGHT: 170 LBS | SYSTOLIC BLOOD PRESSURE: 104 MMHG | RESPIRATION RATE: 16 BRPM | HEIGHT: 72 IN | BODY MASS INDEX: 23.03 KG/M2 | OXYGEN SATURATION: 97 % | HEART RATE: 91 BPM

## 2024-01-24 DIAGNOSIS — M48.062 SPINAL STENOSIS, LUMBAR REGION WITH NEUROGENIC CLAUDICATION: ICD-10-CM

## 2024-01-24 PROCEDURE — 62323 NJX INTERLAMINAR LMBR/SAC: CPT

## 2024-01-24 RX ADMIN — IOHEXOL 0 MG/ML: 180 INJECTION INTRAVENOUS at 00:00

## 2024-01-24 RX ADMIN — TRIAMCINOLONE ACETONIDE 0 MG/ML: 80 INJECTION, SUSPENSION INTRA-ARTICULAR; INTRAMUSCULAR at 00:00

## 2024-01-24 RX ADMIN — LIDOCAINE HYDROCHLORIDE %: 10 INJECTION, SOLUTION INFILTRATION; PERINEURAL at 00:00

## 2024-01-24 NOTE — PROCEDURE
[FreeTextEntry1] : INTERLAMINAR EPIDURAL STEROID INJECTION  The patient was placed in the prone position on the fluoroscopic table with a pillow under the abdomen.  Routine monitors were applied.  A surgical timeout was performed.  The back was prepped and draped in the usual sterile fashion and sterile technique was adhered to throughout the entire procedure.  The [L5-S1] was identified under fluroscopic guidance.  The vertebral body end plates were aligned and the spinous process was midline between the lateral processes.  The skin and subcutaneous tissues were infiltrated with [1% Lidocaine].  After adequate local anesthesia a ["20g Tuohy"] needle was inserted at [L5-S1]   and aimed towards the affected side.    Using a loss of resistance to air technique the epidural space was identified.  After negative aspiration for heme and CSF, 1cc Omnipaque N180 contrast dye was injected.  Epidural spread of contrast was confirmed in the AP and lateral views.  The patient was injected with a mixture of 80mg kenalog with 1cc lidocaine 1% and 2cc of PF normal saline in incremental amounts.  The patient tolerated the procedure well.  There was no neurological deficit post procedure.  The patient went to recovery room in stable condition.  Discharge instructions were given to the patient.

## 2025-09-18 ENCOUNTER — APPOINTMENT (OUTPATIENT)
Dept: SURGERY | Facility: CLINIC | Age: 60
End: 2025-09-18
Payer: COMMERCIAL

## 2025-09-18 ENCOUNTER — NON-APPOINTMENT (OUTPATIENT)
Age: 60
End: 2025-09-18

## 2025-09-18 VITALS
BODY MASS INDEX: 24.65 KG/M2 | TEMPERATURE: 98 F | WEIGHT: 182 LBS | SYSTOLIC BLOOD PRESSURE: 146 MMHG | DIASTOLIC BLOOD PRESSURE: 88 MMHG | HEART RATE: 68 BPM | HEIGHT: 72 IN | OXYGEN SATURATION: 97 %

## 2025-09-18 DIAGNOSIS — Z85.47 PERSONAL HISTORY OF MALIGNANT NEOPLASM OF TESTIS: ICD-10-CM

## 2025-09-18 DIAGNOSIS — K40.90 UNILATERAL INGUINAL HERNIA, W/OUT OBSTRUCTION OR GANGRENE, NOT SPECIFIED AS RECURRENT: ICD-10-CM

## 2025-09-18 PROCEDURE — 99204 OFFICE O/P NEW MOD 45 MIN: CPT
